# Patient Record
Sex: FEMALE | Race: WHITE | NOT HISPANIC OR LATINO | Employment: OTHER | URBAN - METROPOLITAN AREA
[De-identification: names, ages, dates, MRNs, and addresses within clinical notes are randomized per-mention and may not be internally consistent; named-entity substitution may affect disease eponyms.]

---

## 2018-06-03 ENCOUNTER — OFFICE VISIT (OUTPATIENT)
Dept: URGENT CARE | Facility: CLINIC | Age: 73
End: 2018-06-03
Payer: MEDICARE

## 2018-06-03 VITALS
OXYGEN SATURATION: 97 % | RESPIRATION RATE: 19 BRPM | SYSTOLIC BLOOD PRESSURE: 110 MMHG | DIASTOLIC BLOOD PRESSURE: 77 MMHG | TEMPERATURE: 99 F | HEART RATE: 85 BPM

## 2018-06-03 DIAGNOSIS — N39.0 URINARY TRACT INFECTION WITHOUT HEMATURIA, SITE UNSPECIFIED: Primary | ICD-10-CM

## 2018-06-03 DIAGNOSIS — R53.1 WEAKNESS: ICD-10-CM

## 2018-06-03 LAB
BILIRUB UR QL STRIP: NEGATIVE
GLUCOSE UR QL STRIP: NEGATIVE
KETONES UR QL STRIP: NEGATIVE
LEUKOCYTE ESTERASE UR QL STRIP: POSITIVE
PH, POC UA: 5.5 (ref 5–8)
POC BLOOD, URINE: NEGATIVE
POC NITRATES, URINE: NEGATIVE
PROT UR QL STRIP: NEGATIVE
SP GR UR STRIP: 1.01 (ref 1–1.03)
UROBILINOGEN UR STRIP-ACNC: NORMAL (ref 0.1–1.1)

## 2018-06-03 PROCEDURE — 99203 OFFICE O/P NEW LOW 30 MIN: CPT | Mod: 25,S$GLB,, | Performed by: PHYSICIAN ASSISTANT

## 2018-06-03 PROCEDURE — 81003 URINALYSIS AUTO W/O SCOPE: CPT | Mod: QW,S$GLB,, | Performed by: PHYSICIAN ASSISTANT

## 2018-06-03 RX ORDER — QUINAPRIL 10 MG/1
10 TABLET ORAL NIGHTLY
COMMUNITY

## 2018-06-03 RX ORDER — MULTIVITAMIN
1 TABLET ORAL DAILY
COMMUNITY

## 2018-06-03 RX ORDER — NITROFURANTOIN 25; 75 MG/1; MG/1
100 CAPSULE ORAL 2 TIMES DAILY
Qty: 14 CAPSULE | Refills: 0 | Status: SHIPPED | OUTPATIENT
Start: 2018-06-03 | End: 2018-06-10

## 2018-06-03 NOTE — PROGRESS NOTES
Subjective:       Patient ID: Hanna Prince is a 73 y.o. female.    Vitals:  tympanic temperature is 98.6 °F (37 °C). Her blood pressure is 110/77 and her pulse is 85. Her respiration is 19 and oxygen saturation is 97%.     Chief Complaint: Fatigue    Patient presents with a feeling of light headed that occurred at 630 am this morning. Patient stating she sat down and took deep breath which helped. Patient denies SOB, CP, weakness or numbness in her arms or legs, syncope, or near-syncope.  She denies fevers/chills/malaisea, N/V/D, slurred speech, LOC, or confusion. No hx of diabetes. Patient is leaving to go to [place] tomorrow. She is very active in dancing and other activtites. Denies history of cardiac or resp condisiotns. Patient just had annual physical exam with normal labs about 1 month ago. Patient denies current symptoms.       Fatigue   This is a new problem. The current episode started today. The problem occurs constantly. The problem has been gradually improving. Associated symptoms include fatigue. Pertinent negatives include no abdominal pain, chest pain, chills, fever, headaches, nausea, rash, sore throat or vomiting. Nothing aggravates the symptoms.     Review of Systems   Constitution: Positive for fatigue. Negative for chills, fever and malaise/fatigue.   HENT: Negative for hoarse voice and sore throat.    Eyes: Negative for blurred vision.   Cardiovascular: Negative for chest pain, leg swelling, near-syncope, palpitations and syncope.   Respiratory: Negative for shortness of breath.    Skin: Negative for rash.   Musculoskeletal: Negative for back pain and joint pain.   Gastrointestinal: Negative for abdominal pain, diarrhea, nausea and vomiting.   Neurological: Positive for light-headedness. Negative for dizziness and headaches.   Psychiatric/Behavioral: The patient is not nervous/anxious.        Objective:      Physical Exam   Constitutional: She is oriented to person, place, and time.  Vital signs are normal. She appears well-developed and well-nourished. She is active and cooperative.  Non-toxic appearance. She does not appear ill. No distress.   HENT:   Head: Normocephalic and atraumatic.   Right Ear: Hearing, tympanic membrane, external ear and ear canal normal.   Left Ear: Hearing, tympanic membrane, external ear and ear canal normal.   Nose: Nose normal. No mucosal edema, rhinorrhea or nasal deformity. No epistaxis. Right sinus exhibits no maxillary sinus tenderness and no frontal sinus tenderness. Left sinus exhibits no maxillary sinus tenderness and no frontal sinus tenderness.   Mouth/Throat: Uvula is midline, oropharynx is clear and moist and mucous membranes are normal. No trismus in the jaw. Normal dentition. No uvula swelling. No posterior oropharyngeal erythema.   No temporal TTP   Eyes: Conjunctivae, EOM and lids are normal. Pupils are equal, round, and reactive to light. Right eye exhibits no discharge. Left eye exhibits no discharge. No scleral icterus.   Sclera clear bilat   Neck: Trachea normal, normal range of motion, full passive range of motion without pain and phonation normal. Neck supple. No neck rigidity.   Cardiovascular: Normal rate, regular rhythm, normal heart sounds, intact distal pulses and normal pulses.  PMI is not displaced.    Pulses:       Carotid pulses are 2+ on the right side, and 2+ on the left side.       Radial pulses are 2+ on the right side, and 2+ on the left side.        Popliteal pulses are 2+ on the right side, and 2+ on the left side.        Dorsalis pedis pulses are 2+ on the right side, and 2+ on the left side.   Pulmonary/Chest: Effort normal and breath sounds normal. No respiratory distress.   Abdominal: Soft. Normal appearance and bowel sounds are normal. She exhibits no distension, no abdominal bruit, no pulsatile midline mass and no mass. There is no hepatosplenomegaly. There is no tenderness. There is no rigidity, no rebound, no guarding, no  CVA tenderness, no tenderness at McBurney's point and negative Shaw's sign.   Musculoskeletal: Normal range of motion. She exhibits no edema or deformity.   Neurological: She is alert and oriented to person, place, and time. She has normal strength and normal reflexes. No cranial nerve deficit or sensory deficit. She exhibits normal muscle tone. She displays a negative Romberg sign. Coordination normal.   Reflex Scores:       Tricep reflexes are 2+ on the right side and 2+ on the left side.       Bicep reflexes are 2+ on the right side and 2+ on the left side.       Patellar reflexes are 2+ on the right side and 2+ on the left side.       Achilles reflexes are 2+ on the right side and 2+ on the left side.  Skin: Skin is warm, dry and intact. She is not diaphoretic. No pallor.   Psychiatric: She has a normal mood and affect. Her speech is normal and behavior is normal. Judgment and thought content normal. Cognition and memory are normal.   Nursing note and vitals reviewed.        EKG: NSR with ABN. 86 BPM  Assessment:       1. Urinary tract infection without hematuria, site unspecified    2. Weakness        Plan:         Urinary tract infection without hematuria, site unspecified  -     Urine culture  -     nitrofurantoin, macrocrystal-monohydrate, (MACROBID) 100 MG capsule; Take 1 capsule (100 mg total) by mouth 2 (two) times daily.  Dispense: 14 capsule; Refill: 0    Weakness  -     IN OFFICE EKG 12-LEAD (to Muse)  -     POCT Urinalysis, Dipstick, Automated, W/O Scope    Urinary Tract Infections in Women    Urinary tract infections (UTIs) are most often caused by bacteria (germs). These bacteria enter the urinary tract. The bacteria may come from outside the body. Or they may travel from the skin outside the rectum or vagina into the urethra. Female anatomy makes it easy for bacteria from the bowel to enter a womans urinary tract, which is the most common source of UTI. This means women develop UTIs more often  than men. Pain in or around the urinary tract is a common UTI symptom. But the only way to know for sure if you have a UTI for the healthcare provider to test your urine. The two tests that may be done are the urinalysis and urine culture.  Types of UTIs  · Cystitis: A bladder infection (cystitis) is the most common UTI in women. You may have urgent or frequent urination. You may also have pain, burning when you urinate, and bloody urine.  · Urethritis: This is an inflamed urethra, which is the tube that carries urine from the bladder to outside the body. You may have lower stomach or back pain. You may also have urgent or frequent urination.  · Pyelonephritis: This is a kidney infection. If not treated, it can be serious and damage your kidneys. In severe cases, you may be hospitalized. You may have a fever and lower back pain.  Medicines to treat a UTI  Most UTIs are treated with antibiotics. These kill the bacteria. The length of time you need to take them depends on the type of infection. It may be as short as 3 days. If you have repeated UTIs, a low-dose antibiotic may be needed for several months. Take antibiotics exactly as directed. Dont stop taking them until all of the medicine is gone. If you stop taking the antibiotic too soon, the infection may not go away, and you may develop a resistance to the antibiotic. This can make it much harder to treat.  Lifestyle changes to treat and prevent UTIs  The lifestyle changes below will help get rid of your UTI. They may also help prevent future UTIs.  · Drink plenty of fluids. This includes water, juice, or other caffeine-free drinks. Fluids help flush bacteria out of your body.  · Empty your bladder. Always empty your bladder when you feel the urge to urinate. And always urinate before going to sleep. Urine that stays in your bladder can lead to infection. Try to urinate before and after sex as well.  · Practice good personal hygiene. Wipe yourself from front to  back after using the toilet. This helps keep bacteria from getting into the urethra.  · Use condoms during sex. These help prevent UTIs caused by sexually transmitted bacteria. Also, avoid using spermicides during sex. These can increase the risk of UTIs. Choose other forms of birth control instead. For women who tend to get UTIs after sex, a low-dose of a preventive antibiotic may be used. Be sure to discuss this option with your healthcare provider.  · Follow up with your healthcare provider as directed. He or she may test to make sure the infection has cleared. If needed, more treatment may be started.  Date Last Reviewed: 1/1/2017 © 2000-2017 [employer]. [address] [place] [address], [place] [address]. All rights reserved. This information is not intended as a substitute for professional medical care. Always follow your healthcare professional's instructions.      Weakness (Uncertain Cause)  Based on your exam today, the exact cause of your weakness is not certain. However, your weakness does not seem to be a sign of a serious illness at this time. Keep an eye on your symptoms and get medical advice as instructed below.  Home care  · Rest at home today. Do not over-exert yourself.  · Take any medicine as prescribed.  · For the next few days, drink extra fluids (unless your healthcare provider wants you to restrict fluids for other reasons). Do not skip meals.  Follow-up care  Follow up with your healthcare provider or as advised.  When to seek medical advice  Call your healthcare provider for any of the following  · Worsening of your symptoms  · Symptoms don't start getting better within 2 days  · Fever of 100.4º F (38º C) or higher, or as directed by your healthcare provider·    Call 911  Get emergency medical care for any of these:  · Chest, arm, neck, jaw or upper back pain  · Trouble breathing  · Numbness or weakness of the face, one arm or one leg  · Slurred speech, confusion, trouble speaking,  walking or seeing  · Blood in vomit or stool (black or red color)  · Loss of consciousness  Date Last Reviewed: 6/10/2015  © 1164-2803 [employer], [facility]. [address] [place] [address], [place] [address]. All rights reserved. This information is not intended as a substitute for professional medical care. Always follow your healthcare professional's instructions.      FOLLOW UP WITH YOUR PCP AS DISCUSSED. RECOMMEND FOLLOW UP WITH A CARDIOLOGIST AS WELL DUE TO FAMILY HISTORY. RED FLAGS/WARNINGS SIGNS DISCUSSED WITH PATIENT AND HER SON THAT WOULD WARRANT EMERGENT MEDICAL ATTENTION. THEY VERBALIZED UNDERSTANDING.     Please follow up with your Primary care provider within 2-5 days if your signs and symptoms have not resolved or worsen.     If your condition worsens or fails to improve we recommend that you receive another evaluation at the emergency room immediately or contact your primary medical clinic to discuss your concerns.   You must understand that you have received an Urgent Care treatment only and that you may be released before all of your medical problems are known or treated. You, the patient, will arrange for follow up care as instructed.

## 2018-06-03 NOTE — PATIENT INSTRUCTIONS
Urinary Tract Infections in Women    Urinary tract infections (UTIs) are most often caused by bacteria (germs). These bacteria enter the urinary tract. The bacteria may come from outside the body. Or they may travel from the skin outside the rectum or vagina into the urethra. Female anatomy makes it easy for bacteria from the bowel to enter a womans urinary tract, which is the most common source of UTI. This means women develop UTIs more often than men. Pain in or around the urinary tract is a common UTI symptom. But the only way to know for sure if you have a UTI for the healthcare provider to test your urine. The two tests that may be done are the urinalysis and urine culture.  Types of UTIs  · Cystitis: A bladder infection (cystitis) is the most common UTI in women. You may have urgent or frequent urination. You may also have pain, burning when you urinate, and bloody urine.  · Urethritis: This is an inflamed urethra, which is the tube that carries urine from the bladder to outside the body. You may have lower stomach or back pain. You may also have urgent or frequent urination.  · Pyelonephritis: This is a kidney infection. If not treated, it can be serious and damage your kidneys. In severe cases, you may be hospitalized. You may have a fever and lower back pain.  Medicines to treat a UTI  Most UTIs are treated with antibiotics. These kill the bacteria. The length of time you need to take them depends on the type of infection. It may be as short as 3 days. If you have repeated UTIs, a low-dose antibiotic may be needed for several months. Take antibiotics exactly as directed. Dont stop taking them until all of the medicine is gone. If you stop taking the antibiotic too soon, the infection may not go away, and you may develop a resistance to the antibiotic. This can make it much harder to treat.  Lifestyle changes to treat and prevent UTIs  The lifestyle changes below will help get rid of your UTI. They may  also help prevent future UTIs.  · Drink plenty of fluids. This includes water, juice, or other caffeine-free drinks. Fluids help flush bacteria out of your body.  · Empty your bladder. Always empty your bladder when you feel the urge to urinate. And always urinate before going to sleep. Urine that stays in your bladder can lead to infection. Try to urinate before and after sex as well.  · Practice good personal hygiene. Wipe yourself from front to back after using the toilet. This helps keep bacteria from getting into the urethra.  · Use condoms during sex. These help prevent UTIs caused by sexually transmitted bacteria. Also, avoid using spermicides during sex. These can increase the risk of UTIs. Choose other forms of birth control instead. For women who tend to get UTIs after sex, a low-dose of a preventive antibiotic may be used. Be sure to discuss this option with your healthcare provider.  · Follow up with your healthcare provider as directed. He or she may test to make sure the infection has cleared. If needed, more treatment may be started.  Date Last Reviewed: 1/1/2017  © 5400-7598 [employer]. [address] [place] [address]. All rights reserved. This information is not intended as a substitute for professional medical care. Always follow your healthcare professional's instructions.        Weakness (Uncertain Cause)  Based on your exam today, the exact cause of your weakness is not certain. However, your weakness does not seem to be a sign of a serious illness at this time. Keep an eye on your symptoms and get medical advice as instructed below.  Home care  · Rest at home today. Do not over-exert yourself.  · Take any medicine as prescribed.  · For the next few days, drink extra fluids (unless your healthcare provider wants you to restrict fluids for other reasons). Do not skip meals.  Follow-up care  Follow up with your healthcare provider or as advised.  When to seek medical  advice  Call your healthcare provider for any of the following  · Worsening of your symptoms  · Symptoms don't start getting better within 2 days  · Fever of 100.4º F (38º C) or higher, or as directed by your healthcare provider·    Call 911  Get emergency medical care for any of these:  · Chest, arm, neck, jaw or upper back pain  · Trouble breathing  · Numbness or weakness of the face, one arm or one leg  · Slurred speech, confusion, trouble speaking, walking or seeing  · Blood in vomit or stool (black or red color)  · Loss of consciousness  Date Last Reviewed: 6/10/2015  © 2767-9154 [employer]. [address] [place] [address]. All rights reserved. This information is not intended as a substitute for professional medical care. Always follow your healthcare professional's instructions.      Please follow up with your Primary care provider within 2-5 days if your signs and symptoms have not resolved or worsen.     If your condition worsens or fails to improve we recommend that you receive another evaluation at the emergency room immediately or contact your primary medical clinic to discuss your concerns.   You must understand that you have received an Urgent Care treatment only and that you may be released before all of your medical problems are known or treated. You, the patient, will arrange for follow up care as instructed.

## 2018-06-06 ENCOUNTER — TELEPHONE (OUTPATIENT)
Dept: URGENT CARE | Facility: CLINIC | Age: 73
End: 2018-06-06

## 2018-06-06 LAB
BACTERIA UR CULT: NO GROWTH
BACTERIA UR CULT: NORMAL

## 2018-06-16 ENCOUNTER — TELEPHONE (OUTPATIENT)
Dept: URGENT CARE | Facility: CLINIC | Age: 73
End: 2018-06-16

## 2018-06-16 NOTE — TELEPHONE ENCOUNTER
Patient return phone call. Patient states she was out of town when she was call for her result. Patient was inform that her urine culture was negative. Patient states she no questions at the present time.

## 2025-01-14 ENCOUNTER — OFFICE VISIT (OUTPATIENT)
Dept: UROGYNECOLOGY | Facility: CLINIC | Age: 80
End: 2025-01-14
Payer: MEDICARE

## 2025-01-14 VITALS
HEIGHT: 60 IN | BODY MASS INDEX: 24.63 KG/M2 | DIASTOLIC BLOOD PRESSURE: 78 MMHG | HEART RATE: 84 BPM | SYSTOLIC BLOOD PRESSURE: 128 MMHG | WEIGHT: 125.44 LBS

## 2025-01-14 DIAGNOSIS — N81.11 MIDLINE CYSTOCELE: ICD-10-CM

## 2025-01-14 DIAGNOSIS — K59.09 CHRONIC CONSTIPATION: ICD-10-CM

## 2025-01-14 DIAGNOSIS — N81.6 RECTOCELE: ICD-10-CM

## 2025-01-14 DIAGNOSIS — R33.9 INCOMPLETE BLADDER EMPTYING: ICD-10-CM

## 2025-01-14 DIAGNOSIS — N89.8 VAGINAL DRYNESS: ICD-10-CM

## 2025-01-14 DIAGNOSIS — R35.0 URINARY FREQUENCY: Primary | ICD-10-CM

## 2025-01-14 DIAGNOSIS — N99.3 VAGINAL VAULT PROLAPSE AFTER HYSTERECTOMY: ICD-10-CM

## 2025-01-14 PROCEDURE — 1126F AMNT PAIN NOTED NONE PRSNT: CPT | Mod: CPTII,S$GLB,, | Performed by: NURSE PRACTITIONER

## 2025-01-14 PROCEDURE — 3078F DIAST BP <80 MM HG: CPT | Mod: CPTII,S$GLB,, | Performed by: NURSE PRACTITIONER

## 2025-01-14 PROCEDURE — 87086 URINE CULTURE/COLONY COUNT: CPT | Performed by: NURSE PRACTITIONER

## 2025-01-14 PROCEDURE — 51701 INSERT BLADDER CATHETER: CPT | Mod: S$GLB,,, | Performed by: NURSE PRACTITIONER

## 2025-01-14 PROCEDURE — 1101F PT FALLS ASSESS-DOCD LE1/YR: CPT | Mod: CPTII,S$GLB,, | Performed by: NURSE PRACTITIONER

## 2025-01-14 PROCEDURE — 3288F FALL RISK ASSESSMENT DOCD: CPT | Mod: CPTII,S$GLB,, | Performed by: NURSE PRACTITIONER

## 2025-01-14 PROCEDURE — 1160F RVW MEDS BY RX/DR IN RCRD: CPT | Mod: CPTII,S$GLB,, | Performed by: NURSE PRACTITIONER

## 2025-01-14 PROCEDURE — 1159F MED LIST DOCD IN RCRD: CPT | Mod: CPTII,S$GLB,, | Performed by: NURSE PRACTITIONER

## 2025-01-14 PROCEDURE — 3074F SYST BP LT 130 MM HG: CPT | Mod: CPTII,S$GLB,, | Performed by: NURSE PRACTITIONER

## 2025-01-14 PROCEDURE — 99999 PR PBB SHADOW E&M-NEW PATIENT-LVL III: CPT | Mod: PBBFAC,,, | Performed by: NURSE PRACTITIONER

## 2025-01-14 PROCEDURE — 99203 OFFICE O/P NEW LOW 30 MIN: CPT | Mod: 25,S$GLB,, | Performed by: NURSE PRACTITIONER

## 2025-01-14 RX ORDER — ESTRADIOL 0.1 MG/G
1 CREAM VAGINAL DAILY
Qty: 30 G | Refills: 11 | Status: SHIPPED | OUTPATIENT
Start: 2025-01-14 | End: 2026-01-14

## 2025-01-14 RX ORDER — NITROFURANTOIN 25; 75 MG/1; MG/1
100 CAPSULE ORAL
COMMUNITY
Start: 2025-01-08 | End: 2025-01-15

## 2025-01-14 NOTE — PROGRESS NOTES
[facility] - UROGYNECOLOGY  [address] [address]    Hanna Prince  [id number]  1945  2025    Consulting Physician: Pcp, No   GYN: N/A  Primary M.D.: Sandra Franco MD    Chief Complaint   Patient presents with    prolapse     HPI:     1)  UI:  (--) LANCE.  (+) UUI for several months. Wears Poise underwear daily - wets through one. Daytime frequency: Q 1 hours.  Nocturia: Yes: 2/night.   (--) dysuria,  (--) hematuria,  (--) frequent UTIs.  (+) complete bladder emptying.    2)  POP:  Present. Patient presented to ED on 2025 2/2 complaints of bulging coming from her vagina. Per ED note, + uterine prolapse noted and discharged with referral to urogyn.  below introitus.  Symptoms:(--)   (--) vaginal bleeding. (--) vaginal discharge. (--) sexually active.  (--)  Vaginal dryness.  (--) vaginal estrogen use.     3)  BM:  (--) constipation/straining. Does not have a daily bowel movement, reports going every other day.  (--) chronic diarrhea. (--) hematochezia.  (--) fecal incontinence.  (--) fecal smearing/urgency.  (--) incomplete evacuation.    4) Urinary frequency  -- started a few months ago. No associated dysuria. Has not been to urgent care or PCP. Was started on Macrobid for UTI at ED on 2025 and has 3 doses left. No urine cx available       Past Medical History  Past Medical History:   Diagnosis Date    Hypertension         Past Surgical History  Past Surgical History:   Procedure Laterality Date    HYSTERECTOMY          Hysterectomy: Yes   Date:   Type: open  Cervix present/absent: No  Ovaries present/absent: Yes - thinks she still has ovaries    Past Ob History  P2   x 2  Largest infant weight: 9#  no FAVD. yes episiotomy.      Gynecologic History  LMP: No LMP recorded. Patient has had a hysterectomy.  Age of menarche: 13  Age of menopause: 50s  Menstrual history: regular and normal  Last pap: unknown  Last mammogram: 2022,  normal  Colonoscopy: unknown  DEXA: 11/2022, 1. Interval development of osteopenia in the mean of the measured levels of the L1-L4 vertebra. The mean bone mineral density has decreased since the previous exam.   2. Minimal interval worsening of osteoporosis in the left femoral neck.    3. Osteopenia just below the threshold for osteoporosis in the right femoral neck. The bone mineral density has increased since the previous exam, when it was in the osteoporotic range.     Family History  No family history on file.   Colon CA: No  Breast CA: No  GYN CA: No   CA: No    Social History  Social History     Tobacco Use   Smoking Status Never   Smokeless Tobacco Never     Social History     Substance and Sexual Activity   Alcohol Use Yes    Comment: occ   .    Social History     Substance and Sexual Activity   Drug Use Not on file   .  The patient is single.  Resides in [place] [address].  Employment status: retired at [employer]    Allergies  Review of patient's allergies indicates:  No Known Allergies    Medications  Current Outpatient Medications on File Prior to Visit   Medication Sig Dispense Refill    multivitamin (ONE DAILY MULTIVITAMIN) per tablet Take 1 tablet by mouth once daily.      nitrofurantoin, macrocrystal-monohydrate, (MACROBID) 100 MG capsule Take 100 mg by mouth.      quinapril (ACCUPRIL) 10 MG tablet Take 10 mg by mouth every evening.       No current facility-administered medications on file prior to visit.       Review of Systems A 14 point ROS was reviewed with pertinent positives as noted above in the history of present illness.      Constitutional: negative  Eyes: negative  Endocrine: negative  Gastrointestinal: negative  Cardiovascular: negative  Respiratory: negative  Allergic/Immunologic: negative  Integumentary: negative  Psychiatric: negative  Musculoskeletal: negative   Ear/Nose/Throat: negative  Neurologic: negative  Genitourinary: SEE HPI  Hematologic/Lymphatic: negative    Breast: negative    Urogynecologic Exam  /78 (BP Location: Left arm, Patient Position: Sitting)   Pulse 84   Ht 5' (1.524 m)   Wt 56.9 kg (125 lb 7.1 oz)   BMI 24.50 kg/m²     GENERAL APPEARANCE:  The patient is a well-developed, well-nourished  female.  Neck:  Supple with no thyromegaly, no carotid bruits.  Heart:  Regular rate and rhythm, no murmurs, rubs or gallops.  Lungs:  Clear.  No CVA tenderness.  Abdomen:  Soft, nontender, nondistended, no hepatosplenomegaly.  Incisions:  well-healed pfannenstiel     PELVIC:    External genitalia:  Normal Bartholins, Skenes and labia bilaterally.    Urethra:  No caruncle, diverticulum or masses.  (+) hypermobility.    Vagina:  Atrophy (+) , no bladder masses or tender, no discharge.    Cervix:  absent  Uterus: uterus absent  Adnexa: Not palpable.    POP-Q:  Aa +2; Ba +5; C +2; Ap 0; Bp 0  Genital hiatus 5, perineal body 3 total vaginal length 8.    NEUROLOGIC:  Cranial nerves 2 through 12 intact.  Strength 5/5.  DTRs 2+ lower extremities.  S2 through 4 normal.  Sacral reflexes intact.    EXT: PENA, 2+ pulses bilaterally, no C/C/E    COUGH STRESS TEST:  negative  KEGEL: 1 /5    RECTAL:    External:  Normal, (--) hemorrhoids, (--) dovetailing.   Internal:  Deferred    PVR: 110 mL    Impression    1. Urinary frequency    2. Vaginal vault prolapse after hysterectomy    3. Incomplete bladder emptying    4. Midline cystocele    5. Rectocele    6. Vaginal dryness    7. Chronic constipation        Initial Plan  The patient was counseled regarding these issues. She was given a summary sheet containing each of these issues with possible options for evaluation and management. We also reviewed computer-generated diagrams specific to her pelvic organ prolapse quantification findings and she was given a copy of this for her records.  All her questions were addressed to her satisfaction.    1)  Urge Incontinence/urinary frequency   -- finish course of macrobid  -- Empty bladder  every 2-3 hours even if you don't have the urge.  Empty well: wait a minute, lean forward on toilet.    --Try not to go more frequently than once an hour. When you get the urge to urinate after you have just gone, distract yourself until the urge passes.   --Avoid dietary irritants (see sheet).  Keep diary x 3-5 days to determine your irritants.  --KEGELS: do 10 in AM and 10 in PM, holding each x 10 seconds.  When you feel urge to go, STOP, KEGEL, and when urge has passed, then go to bathroom.    --Consider Pelvic Floor Physical Therapy (PT) in future.    -- PVR (post void residual): 110 mL    2) Chronic Constipation  -- Start daily fiber.  Take 1 tsp of fiber powder (psyllium or other sugar-free powder).  Mix in 8 oz of water.  Take x 3-5 days.  Then, increase fiber by 1 tsp every 3-5 days until stool is easy to pass.  Stop and continue at that dose.   Do not exceed 6 tsps/day.  May also use over the counter stool softener 1-2 x/day.  AVOID laxatives.    3) Vaginal dryness (atrophy)  -- Use 1 gram of estrogen cream in vagina nightly x 2 weeks, then twice a week thereafter.  Remove the applicator and use your finger (up to the second knuckle) to place a dime-sized amount on the inside of your vagina and then around the outer lips and opening.   -- apply Aquaphor or A&D ointment on bulge when wearing Depends/Poise underwear.    4) Vaginal vault prolapse, stage 3  -- pessary fitting scheduled 2/3/25  -- consider urodynamics vs surgical options if fails pessary fitting    5) RTC as scheduled      I spent a total of 30 minutes on the day of the visit.  This includes face to face time and non-face to face time preparing to see the patient (eg, review of tests), obtaining and/or reviewing separately obtained history, documenting clinical information in the electronic or other health record, independently interpreting results and communicating results to the patient/family/caregiver, or care coordinator.     Thank you for  requesting consultation of your patient.  I look forward to participating in her care.

## 2025-01-14 NOTE — PATIENT INSTRUCTIONS
1)  Urge Incontinence/urinary frequency   -- finish course of macrobid  -- Empty bladder every 2-3 hours even if you don't have the urge.  Empty well: wait a minute, lean forward on toilet.    --Try not to go more frequently than once an hour. When you get the urge to urinate after you have just gone, distract yourself until the urge passes.   --Avoid dietary irritants (see sheet).  Keep diary x 3-5 days to determine your irritants.  --KEGELS: do 10 in AM and 10 in PM, holding each x 10 seconds.  When you feel urge to go, STOP, KEGEL, and when urge has passed, then go to bathroom.    --Consider Pelvic Floor Physical Therapy (PT) in future.    -- PVR (post void residual): 110 mL    2) Chronic Constipation  -- Start daily fiber.  Take 1 tsp of fiber powder (psyllium or other sugar-free powder).  Mix in 8 oz of water.  Take x 3-5 days.  Then, increase fiber by 1 tsp every 3-5 days until stool is easy to pass.  Stop and continue at that dose.   Do not exceed 6 tsps/day.  May also use over the counter stool softener 1-2 x/day.  AVOID laxatives.    3) Vaginal dryness (atrophy)  -- Use 1 gram of estrogen cream in vagina nightly x 2 weeks, then twice a week thereafter.  Remove the applicator and use your finger (up to the second knuckle) to place a dime-sized amount on the inside of your vagina and then around the outer lips and opening.   -- apply Aquaphor or A&D ointment on bulge when wearing Depends/Poise underwear.    4) Vaginal vault prolapse, stage 3  -- pessary fitting scheduled 2/3/25  -- consider urodynamics vs surgical options if fails pessary fitting    Bladder Irritants  Certain foods and drinks have been associated with worsening symptoms of urinary frequency, urgency, urge incontinence, or bladder pain. If you suffer from any of these conditions, you may wish to try eliminating one or more of these foods from your diet and see if your symptoms improve. If bladder symptoms are related to dietary factors, strict  adherence to a diet thateliminates the food should bring marked relief in 10 days. Once you are feeling better, you can begin to add foods back into your diet, one at a time. If symptoms return, you will be able to identify the irritant. As you add foods back to your diet it is very important that you drink significant amounts of water.    -----------------------------------------------------------------------------------------------  List of Common Bladder Irritants*  Alcoholic beverages  Apples and apple juice  Cantaloupe  Carbonated beverages  Chili and spicy foods  Chocolate  Citrus fruit  Coffee (including decaffeinated)  Cranberries and cranberry juice  Grapes  Guava  Milk Products: milk, cheese, cottage cheese, yogurt, ice cream  Peaches  Pineapple  Plums  Strawberries  Sugar especially artificial sweeteners, saccharin, aspartame, corn sweeteners, honey, fructose, sucrose, lactose  Tea  Tomatoes and tomato juice  Vitamin B complex  Vinegar  *Most people are not sensitive to ALL of these products; your goal is to find the foods that make YOUR symptoms worse.  ---------------------------------------------------------------------------------------------------    Low-acid fruit substitutions include apricots, papaya, pears and watermelon. Coffee drinkers can drink Kava or other lowacid instant drinks. Tea drinkers can substitute non-citrus herbal and sun brewed teas. Calcium carbonate co-buffered with calcium ascorbate can be substituted for Vitamin C. Prelief is a dietary supplement that works as an acid blocker for the bladder.    Where to get more information:        Overcoming Bladder Disorders by Su Santillan and Elizabeth Daniel, 1990        You Dont Have to Live with Cystitis! By Suki Gaming, 1988  http://[email or web address].org/oab

## 2025-01-16 LAB — BACTERIA UR CULT: NO GROWTH

## 2025-01-17 ENCOUNTER — TELEPHONE (OUTPATIENT)
Dept: UROGYNECOLOGY | Facility: CLINIC | Age: 80
End: 2025-01-17
Payer: MEDICARE

## 2025-01-17 NOTE — TELEPHONE ENCOUNTER
Contacted patient to relay Steven below message. Spoke with son who verbalized understanding and denies other needs at this time. Call ended.      ----- Message from Raquel Coy NP sent at 1/16/2025  5:20 PM CST -----  Please let patient know her urine culture was negative.     Raquel Hodge, FNP-BC

## 2025-02-03 ENCOUNTER — OFFICE VISIT (OUTPATIENT)
Dept: UROGYNECOLOGY | Facility: CLINIC | Age: 80
End: 2025-02-03
Payer: MEDICARE

## 2025-02-03 VITALS
SYSTOLIC BLOOD PRESSURE: 117 MMHG | WEIGHT: 123.69 LBS | HEIGHT: 60 IN | DIASTOLIC BLOOD PRESSURE: 74 MMHG | BODY MASS INDEX: 24.28 KG/M2

## 2025-02-03 DIAGNOSIS — R33.9 INCOMPLETE BLADDER EMPTYING: ICD-10-CM

## 2025-02-03 DIAGNOSIS — N81.11 MIDLINE CYSTOCELE: ICD-10-CM

## 2025-02-03 DIAGNOSIS — N81.6 RECTOCELE: ICD-10-CM

## 2025-02-03 DIAGNOSIS — N89.8 VAGINAL DRYNESS: ICD-10-CM

## 2025-02-03 DIAGNOSIS — R35.0 URINARY FREQUENCY: Primary | ICD-10-CM

## 2025-02-03 DIAGNOSIS — N99.3 VAGINAL VAULT PROLAPSE AFTER HYSTERECTOMY: ICD-10-CM

## 2025-02-03 PROCEDURE — 3078F DIAST BP <80 MM HG: CPT | Mod: CPTII,S$GLB,, | Performed by: NURSE PRACTITIONER

## 2025-02-03 PROCEDURE — 99213 OFFICE O/P EST LOW 20 MIN: CPT | Mod: 25,S$GLB,, | Performed by: NURSE PRACTITIONER

## 2025-02-03 PROCEDURE — 1160F RVW MEDS BY RX/DR IN RCRD: CPT | Mod: CPTII,S$GLB,, | Performed by: NURSE PRACTITIONER

## 2025-02-03 PROCEDURE — 1126F AMNT PAIN NOTED NONE PRSNT: CPT | Mod: CPTII,S$GLB,, | Performed by: NURSE PRACTITIONER

## 2025-02-03 PROCEDURE — 57160 INSERT PESSARY/OTHER DEVICE: CPT | Mod: S$GLB,,, | Performed by: NURSE PRACTITIONER

## 2025-02-03 PROCEDURE — 1159F MED LIST DOCD IN RCRD: CPT | Mod: CPTII,S$GLB,, | Performed by: NURSE PRACTITIONER

## 2025-02-03 PROCEDURE — 99999 PR PBB SHADOW E&M-EST. PATIENT-LVL III: CPT | Mod: PBBFAC,,, | Performed by: NURSE PRACTITIONER

## 2025-02-03 PROCEDURE — 3074F SYST BP LT 130 MM HG: CPT | Mod: CPTII,S$GLB,, | Performed by: NURSE PRACTITIONER

## 2025-02-03 NOTE — PROGRESS NOTES
Urogyn follow up  02/03/2025  .  [facility] - UROGYNECOLOGY  [address] [address]    Hanna Prince  [id number]  1945      Hanna Prince is a 79 y.o. here for a urogyn follow up for pessary fitting.    Last HPI from 01/14/2025  1)  UI:  (--) LANCE.  (+) UUI for several months. Wears Poise underwear daily - wets through one. Daytime frequency: Q 1 hours.  Nocturia: Yes: 2/night.   (--) dysuria,  (--) hematuria,  (--) frequent UTIs.  (+) complete bladder emptying.     2)  POP:  Present. Patient presented to ED on 1/8/2025 2/2 complaints of bulging coming from her vagina. Per ED note, + uterine prolapse noted and discharged with referral to urogyn.  below introitus.  Symptoms:(--)   (--) vaginal bleeding. (--) vaginal discharge. (--) sexually active.  (--)  Vaginal dryness.  (--) vaginal estrogen use.   POP-Q:  Aa +2; Ba +5; C +2; Ap 0; Bp 0  Genital hiatus 5, perineal body 3 total vaginal length 8.   Pvr 110 mL     3)  BM:  (--) constipation/straining. Does not have a daily bowel movement, reports going every other day.  (--) chronic diarrhea. (--) hematochezia.  (--) fecal incontinence.  (--) fecal smearing/urgency.  (--) incomplete evacuation.     4) Urinary frequency  -- started a few months ago. No associated dysuria. Has not been to urgent care or PCP. Was started on Macrobid for UTI at ED on 1/8/2025 and has 3 doses left. No urine cx available            Past Medical History:   Diagnosis Date    Hypertension        Past Surgical History:   Procedure Laterality Date    HYSTERECTOMY         No family history on file.    Social History     Socioeconomic History    Marital status: Single   Tobacco Use    Smoking status: Never    Smokeless tobacco: Never   Substance and Sexual Activity    Alcohol use: Yes     Comment: occ       Current Outpatient Medications   Medication Sig Dispense Refill    estradioL (ESTRACE) 0.01 % (0.1 mg/gram) vaginal cream Place 1 g vaginally once daily. 30  g 11    multivitamin (ONE DAILY MULTIVITAMIN) per tablet Take 1 tablet by mouth once daily.      quinapril (ACCUPRIL) 10 MG tablet Take 10 mg by mouth every evening.       No current facility-administered medications for this visit.       Review of patient's allergies indicates:  No Known Allergies    Well woman:  Last pap: unknown  Last mammogram: 11/2022, normal  Colonoscopy: unknown  DEXA: 11/2022, 1. Interval development of osteopenia in the mean of the measured levels of the L1-L4 vertebra. The mean bone mineral density has decreased since the previous exam.   2. Minimal interval worsening of osteoporosis in the left femoral neck.    3. Osteopenia just below the threshold for osteoporosis in the right femoral neck. The bone mineral density has increased since the previous exam, when it was in the osteoporotic range.     ROS:  As per HPI.      Exam  /74 (Patient Position: Sitting)   Ht 5' (1.524 m)   Wt 56.1 kg (123 lb 10.9 oz)   BMI 24.15 kg/m²   General: alert and oriented, no acute distress  Respiratory: normal respiratory effort  Abd: soft, non-tender, non-distended    Pelvic  Ext. Genitalia: normal external genitalia. Normal bartholin's and skeens glands  Vagina: + atrophy. Normal vaginal mucosa without lesions. No discharge noted.   Non-tender bladder base without palpable mass.  Cervix: absent  Uterus:  absent   Urethra: no masses or tenderness  Urethral meatus: no lesions, caruncle or prolapse.    The patient was fit with #3 ring with support pessary.  She was able to tolerate the device comfortabley with bending, squatting, valsalva.  She was not able to demonstrate independent removal and placement.  Will order pessary from [employer]. She tolerated the procedure well.       Impression  1. 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 Incomplete bladder emptying        4. Midline cystocele        5. Rectocele        6. Vaginal dryness          We reviewed the  above issues and discussed options for short-term versus long-term management of her problems.   Plan:   Cystocele/ vaginal vault prolapse  --fit with #3 ring with support pessary -- will order from [facility]    2) Vaginal dryness (atrophy)  -- Use 1 gram of estrogen cream in vagina  twice a week thereafter.  Remove the applicator and use your finger (up to the second knuckle) to place a dime-sized amount on the inside of your vagina and then around the outer lips and opening.   -- apply Aquaphor or A&D ointment on bulge when wearing Depends/Poise underwear.     3. RTC for pessary insertion     I spent a total of 20 minutes on the day of the visit.  This includes face to face time and non-face to face time preparing to see the patient (eg, review of tests), obtaining and/or reviewing separately obtained history, documenting clinical information in the electronic or other health record, independently interpreting results and communicating results to the patient/family/caregiver, or care coordinator.     Raquel Coy, FNP-BC  [facility]  Division of Female Pelvic Medicine and Reconstructive Surgery  Department of Obstetrics & Gynecology

## 2025-02-03 NOTE — PATIENT INSTRUCTIONS
Cystocele/ vaginal vault prolapse  --fit with #3 ring with support pessary -- will order from [employer]    2) Vaginal dryness (atrophy)  -- Use 1 gram of estrogen cream in vagina  twice a week thereafter.  Remove the applicator and use your finger (up to the second knuckle) to place a dime-sized amount on the inside of your vagina and then around the outer lips and opening.   -- apply Aquaphor or A&D ointment on bulge when wearing Depends/Poise underwear.     3. RTC for pessary insertion

## 2025-02-20 ENCOUNTER — TELEPHONE (OUTPATIENT)
Dept: UROGYNECOLOGY | Facility: CLINIC | Age: 80
End: 2025-02-20
Payer: MEDICARE

## 2025-02-20 NOTE — TELEPHONE ENCOUNTER
----- Message from Parvin sent at 2/19/2025  4:56 PM CST -----  Regarding: reschedule  Name of Who is Calling: GABY aPulson What is the request in detail: Lorene is requesting a call back to reschedule patient appointment.  Can the clinic reply by [facility]: No What Number [facility]: [phone]

## 2025-02-21 ENCOUNTER — OFFICE VISIT (OUTPATIENT)
Dept: UROGYNECOLOGY | Facility: CLINIC | Age: 80
End: 2025-02-21
Payer: MEDICARE

## 2025-02-21 VITALS
SYSTOLIC BLOOD PRESSURE: 136 MMHG | WEIGHT: 125 LBS | HEART RATE: 85 BPM | DIASTOLIC BLOOD PRESSURE: 86 MMHG | BODY MASS INDEX: 24.54 KG/M2 | HEIGHT: 60 IN

## 2025-02-21 DIAGNOSIS — K59.09 CHRONIC CONSTIPATION: ICD-10-CM

## 2025-02-21 DIAGNOSIS — N99.3 VAGINAL VAULT PROLAPSE AFTER HYSTERECTOMY: ICD-10-CM

## 2025-02-21 DIAGNOSIS — N89.8 VAGINAL DRYNESS: ICD-10-CM

## 2025-02-21 DIAGNOSIS — N81.11 MIDLINE CYSTOCELE: ICD-10-CM

## 2025-02-21 DIAGNOSIS — R35.0 URINARY FREQUENCY: Primary | ICD-10-CM

## 2025-02-21 DIAGNOSIS — R33.9 INCOMPLETE BLADDER EMPTYING: ICD-10-CM

## 2025-02-21 DIAGNOSIS — Z46.89 PESSARY MAINTENANCE: ICD-10-CM

## 2025-02-21 DIAGNOSIS — N81.6 RECTOCELE: ICD-10-CM

## 2025-02-21 PROCEDURE — 3288F FALL RISK ASSESSMENT DOCD: CPT | Mod: CPTII,S$GLB,, | Performed by: NURSE PRACTITIONER

## 2025-02-21 PROCEDURE — 3079F DIAST BP 80-89 MM HG: CPT | Mod: CPTII,S$GLB,, | Performed by: NURSE PRACTITIONER

## 2025-02-21 PROCEDURE — 1160F RVW MEDS BY RX/DR IN RCRD: CPT | Mod: CPTII,S$GLB,, | Performed by: NURSE PRACTITIONER

## 2025-02-21 PROCEDURE — 3075F SYST BP GE 130 - 139MM HG: CPT | Mod: CPTII,S$GLB,, | Performed by: NURSE PRACTITIONER

## 2025-02-21 PROCEDURE — 1159F MED LIST DOCD IN RCRD: CPT | Mod: CPTII,S$GLB,, | Performed by: NURSE PRACTITIONER

## 2025-02-21 PROCEDURE — 1101F PT FALLS ASSESS-DOCD LE1/YR: CPT | Mod: CPTII,S$GLB,, | Performed by: NURSE PRACTITIONER

## 2025-02-21 PROCEDURE — 99212 OFFICE O/P EST SF 10 MIN: CPT | Mod: S$GLB,,, | Performed by: NURSE PRACTITIONER

## 2025-02-21 PROCEDURE — 99999 PR PBB SHADOW E&M-EST. PATIENT-LVL III: CPT | Mod: PBBFAC,,, | Performed by: NURSE PRACTITIONER

## 2025-02-21 PROCEDURE — 1126F AMNT PAIN NOTED NONE PRSNT: CPT | Mod: CPTII,S$GLB,, | Performed by: NURSE PRACTITIONER

## 2025-02-21 NOTE — PROGRESS NOTES
Urogyn follow up  02/21/2025  .  [facility] [place] - UROGYNECOLOGY  [address] [address]    Hanna Prince  [id number]  1945      Hanna Prince is a 79 y.o. here for a urogyn follow up for pessary insertion    Last HPI from 01/14/2025  1)  UI:  (--) LANCE.  (+) UUI for several months. Wears Poise underwear daily - wets through one. Daytime frequency: Q 1 hours.  Nocturia: Yes: 2/night.   (--) dysuria,  (--) hematuria,  (--) frequent UTIs.  (+) complete bladder emptying.     2)  POP:  Present. Patient presented to ED on 1/8/2025 2/2 complaints of bulging coming from her vagina. Per ED note, + uterine prolapse noted and discharged with referral to urogyn.  below introitus.  Symptoms:(--)   (--) vaginal bleeding. (--) vaginal discharge. (--) sexually active.  (--)  Vaginal dryness.  (--) vaginal estrogen use.   POP-Q:  Aa +2; Ba +5; C +2; Ap 0; Bp 0  Genital hiatus 5, perineal body 3 total vaginal length 8.   Pvr 110 mL     3)  BM:  (--) constipation/straining. Does not have a daily bowel movement, reports going every other day.  (--) chronic diarrhea. (--) hematochezia.  (--) fecal incontinence.  (--) fecal smearing/urgency.  (--) incomplete evacuation.     4) Urinary frequency  -- started a few months ago. No associated dysuria. Has not been to urgent care or PCP. Was started on Macrobid for UTI at ED on 1/8/2025 and has 3 doses left. No urine cx available      02/03/2025  Fit with #3 ring with support pessary      Past Medical History:   Diagnosis Date    Hypertension        Past Surgical History:   Procedure Laterality Date    HYSTERECTOMY         No family history on file.    Social History     Socioeconomic History    Marital status: Single   Tobacco Use    Smoking status: Never    Smokeless tobacco: Never   Substance and Sexual Activity    Alcohol use: Yes     Comment: occ       Current Outpatient Medications   Medication Sig Dispense Refill    estradioL (ESTRACE) 0.01 % (0.1 mg/gram)  vaginal cream Place 1 g vaginally once daily. 30 g 11    multivitamin (ONE DAILY MULTIVITAMIN) per tablet Take 1 tablet by mouth once daily.      quinapril (ACCUPRIL) 10 MG tablet Take 10 mg by mouth every evening.       No current facility-administered medications for this visit.       Review of patient's allergies indicates:  No Known Allergies    Well woman:  Last pap: unknown  Last mammogram: 11/2022, normal  Colonoscopy: unknown  DEXA: 11/2022, 1. Interval development of osteopenia in the mean of the measured levels of the L1-L4 vertebra. The mean bone mineral density has decreased since the previous exam.   2. Minimal interval worsening of osteoporosis in the left femoral neck.    3. Osteopenia just below the threshold for osteoporosis in the right femoral neck. The bone mineral density has increased since the previous exam, when it was in the osteoporotic range.     ROS:  As per HPI.      Exam  /86 (BP Location: Right arm, Patient Position: Sitting)   Pulse 85   Ht 5' (1.524 m)   Wt 56.7 kg (125 lb)   BMI 24.41 kg/m²   General: alert and oriented, no acute distress  Respiratory: normal respiratory effort  Abd: soft, non-tender, non-distended    Pelvic  Ext. Genitalia: normal external genitalia. Normal bartholin's and skeens glands  Vagina: + atrophy. Normal vaginal mucosa without lesions. No discharge noted.   Non-tender bladder base without palpable mass.  Cervix: absent  Uterus:  absent   Urethra: no masses or tenderness  Urethral meatus: no lesions, caruncle or prolapse.    #3 ring with support pessary inserted    Impression  1. 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 Midline cystocele        4. Rectocele        5. Incomplete bladder emptying        6. Vaginal dryness        7. Chronic constipation        8. Pessary maintenance            We reviewed the above issues and discussed options for short-term versus long-term management of her problems.   Plan:    Cystocele/ vaginal vault prolapse  --trial #3 ring with support pessary -- will order from [employer]    2) Vaginal dryness (atrophy)  -- Use 1 gram of estrogen cream in vagina  twice a week thereafter.  Remove the applicator and use your finger (up to the second knuckle) to place a dime-sized amount on the inside of your vagina and then around the outer lips and opening.   -- apply Aquaphor or A&D ointment on bulge when wearing Depends/Poise underwear.     3. RTC for pessary check in 4 weeks     I spent a total of 10 minutes on the day of the visit.  This includes face to face time and non-face to face time preparing to see the patient (eg, review of tests), obtaining and/or reviewing separately obtained history, documenting clinical information in the electronic or other health record, independently interpreting results and communicating results to the patient/family/caregiver, or care coordinator.     Raquel Coy, FNP-BC  [facility]  Division of Female Pelvic Medicine and Reconstructive Surgery  Department of Obstetrics & Gynecology

## 2025-02-26 ENCOUNTER — OFFICE VISIT (OUTPATIENT)
Dept: UROGYNECOLOGY | Facility: CLINIC | Age: 80
End: 2025-02-26
Payer: MEDICARE

## 2025-02-26 VITALS
DIASTOLIC BLOOD PRESSURE: 75 MMHG | HEIGHT: 60 IN | WEIGHT: 121.5 LBS | HEART RATE: 83 BPM | BODY MASS INDEX: 23.85 KG/M2 | SYSTOLIC BLOOD PRESSURE: 130 MMHG

## 2025-02-26 DIAGNOSIS — R33.9 INCOMPLETE BLADDER EMPTYING: ICD-10-CM

## 2025-02-26 DIAGNOSIS — R35.0 URINARY FREQUENCY: Primary | ICD-10-CM

## 2025-02-26 DIAGNOSIS — Z46.89 PESSARY MAINTENANCE: ICD-10-CM

## 2025-02-26 DIAGNOSIS — N81.6 RECTOCELE: ICD-10-CM

## 2025-02-26 DIAGNOSIS — N99.3 VAGINAL VAULT PROLAPSE AFTER HYSTERECTOMY: ICD-10-CM

## 2025-02-26 DIAGNOSIS — N89.8 VAGINAL DRYNESS: ICD-10-CM

## 2025-02-26 DIAGNOSIS — N81.11 MIDLINE CYSTOCELE: ICD-10-CM

## 2025-02-26 PROCEDURE — 99999 PR PBB SHADOW E&M-EST. PATIENT-LVL III: CPT | Mod: PBBFAC,,, | Performed by: NURSE PRACTITIONER

## 2025-02-26 PROCEDURE — 1126F AMNT PAIN NOTED NONE PRSNT: CPT | Mod: CPTII,S$GLB,, | Performed by: NURSE PRACTITIONER

## 2025-02-26 PROCEDURE — 3078F DIAST BP <80 MM HG: CPT | Mod: CPTII,S$GLB,, | Performed by: NURSE PRACTITIONER

## 2025-02-26 PROCEDURE — 99213 OFFICE O/P EST LOW 20 MIN: CPT | Mod: S$GLB,,, | Performed by: NURSE PRACTITIONER

## 2025-02-26 PROCEDURE — 1159F MED LIST DOCD IN RCRD: CPT | Mod: CPTII,S$GLB,, | Performed by: NURSE PRACTITIONER

## 2025-02-26 PROCEDURE — 3075F SYST BP GE 130 - 139MM HG: CPT | Mod: CPTII,S$GLB,, | Performed by: NURSE PRACTITIONER

## 2025-02-26 PROCEDURE — 1160F RVW MEDS BY RX/DR IN RCRD: CPT | Mod: CPTII,S$GLB,, | Performed by: NURSE PRACTITIONER

## 2025-02-26 NOTE — PROGRESS NOTES
Urogyn follow up  02/26/2025  .  [facility]  [address]  [facility] [id number]    Hanna Prince  [id number]  1945      Hanna Prince is a 79 y.o. here for a urogyn follow up for pessary insertion    Last HPI from 01/14/2025  1)  UI:  (--) LANCE.  (+) UUI for several months. Wears Poise underwear daily - wets through one. Daytime frequency: Q 1 hours.  Nocturia: Yes: 2/night.   (--) dysuria,  (--) hematuria,  (--) frequent UTIs.  (+) complete bladder emptying.     2)  POP:  Present. Patient presented to ED on 1/8/2025 2/2 complaints of bulging coming from her vagina. Per ED note, + uterine prolapse noted and discharged with referral to urogyn.  below introitus.  Symptoms:(--)   (--) vaginal bleeding. (--) vaginal discharge. (--) sexually active.  (--)  Vaginal dryness.  (--) vaginal estrogen use.   POP-Q:  Aa +2; Ba +5; C +2; Ap 0; Bp 0  Genital hiatus 5, perineal body 3 total vaginal length 8.   Pvr 110 mL     3)  BM:  (--) constipation/straining. Does not have a daily bowel movement, reports going every other day.  (--) chronic diarrhea. (--) hematochezia.  (--) fecal incontinence.  (--) fecal smearing/urgency.  (--) incomplete evacuation.     4) Urinary frequency  -- started a few months ago. No associated dysuria. Has not been to urgent care or PCP. Was started on Macrobid for UTI at ED on 1/8/2025 and has 3 doses left. No urine cx available      02/03/2025  Fit with #3 ring with support pessary    02/21/2025  #3 ring with support pessary inserted    Changes since last visit  Pessary came out same day as insertion      Past Medical History:   Diagnosis Date    Hypertension        Past Surgical History:   Procedure Laterality Date    HYSTERECTOMY         No family history on file.    Social History     Socioeconomic History    Marital status: Single   Tobacco Use    Smoking status: Never    Smokeless tobacco: Never   Substance and Sexual Activity    Alcohol use: Yes     Comment:  occ       Current Outpatient Medications   Medication Sig Dispense Refill    estradioL (ESTRACE) 0.01 % (0.1 mg/gram) vaginal cream Place 1 g vaginally once daily. 30 g 11    multivitamin (ONE DAILY MULTIVITAMIN) per tablet Take 1 tablet by mouth once daily.      quinapril (ACCUPRIL) 10 MG tablet Take 10 mg by mouth every evening.       No current facility-administered medications for this visit.       Review of patient's allergies indicates:  No Known Allergies    Well woman:  Last pap: unknown  Last mammogram: 11/2022, normal  Colonoscopy: unknown  DEXA: 11/2022, 1. Interval development of osteopenia in the mean of the measured levels of the L1-L4 vertebra. The mean bone mineral density has decreased since the previous exam.   2. Minimal interval worsening of osteoporosis in the left femoral neck.    3. Osteopenia just below the threshold for osteoporosis in the right femoral neck. The bone mineral density has increased since the previous exam, when it was in the osteoporotic range.     ROS:  As per HPI.      Exam  /75 (BP Location: Right arm, Patient Position: Sitting)   Pulse 83   Ht 5' (1.524 m)   Wt 55.1 kg (121 lb 7.6 oz)   BMI 23.72 kg/m²   General: alert and oriented, no acute distress  Respiratory: normal respiratory effort  Abd: soft, non-tender, non-distended    Pelvic  Ext. Genitalia: normal external genitalia. Normal bartholin's and skeens glands  Vagina: + atrophy. Normal vaginal mucosa without lesions. No discharge noted.   Non-tender bladder base without palpable mass.  Cervix: absent  Uterus:  absent   Urethra: no masses or tenderness  Urethral meatus: no lesions, caruncle or prolapse.    #3 ring with support pessary inserted    Attempted to teach patient reinsertion-- not able to perform independently    Impression  1. 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 Midline cystocele        4. Rectocele        5. Incomplete bladder emptying        6.  Pessary maintenance        7. Vaginal dryness              We reviewed the above issues and discussed options for short-term versus long-term management of her problems.   Plan:   Cystocele/ vaginal vault prolapse  --trial #3 ring with support pessary -- will order from [facility]    2) Vaginal dryness (atrophy)  -- Use 1 gram of estrogen cream in vagina  twice a week thereafter.  Remove the applicator and use your finger (up to the second knuckle) to place a dime-sized amount on the inside of your vagina and then around the outer lips and opening.   -- apply Aquaphor or A&D ointment on bulge when wearing Depends/Poise underwear.     3. RTC for pessary check in 4 weeks     I spent a total of 20 minutes on the day of the visit.  This includes face to face time and non-face to face time preparing to see the patient (eg, review of tests), obtaining and/or reviewing separately obtained history, documenting clinical information in the electronic or other health record, independently interpreting results and communicating results to the patient/family/caregiver, or care coordinator.     Raquel Coy, MADINA-BC [facility]  Division of Female Pelvic Medicine and Reconstructive Surgery  Department of Obstetrics & Gynecology

## 2025-04-07 ENCOUNTER — OFFICE VISIT (OUTPATIENT)
Dept: UROGYNECOLOGY | Facility: CLINIC | Age: 80
End: 2025-04-07
Payer: MEDICARE

## 2025-04-07 VITALS — SYSTOLIC BLOOD PRESSURE: 147 MMHG | DIASTOLIC BLOOD PRESSURE: 88 MMHG | HEART RATE: 77 BPM

## 2025-04-07 DIAGNOSIS — R35.0 URINARY FREQUENCY: ICD-10-CM

## 2025-04-07 DIAGNOSIS — N99.3 VAGINAL VAULT PROLAPSE AFTER HYSTERECTOMY: ICD-10-CM

## 2025-04-07 DIAGNOSIS — N36.2 URETHRAL POLYP: ICD-10-CM

## 2025-04-07 DIAGNOSIS — N39.46 MIXED INCONTINENCE URGE AND STRESS: ICD-10-CM

## 2025-04-07 DIAGNOSIS — N81.6 RECTOCELE: ICD-10-CM

## 2025-04-07 DIAGNOSIS — K59.09 CHRONIC CONSTIPATION: ICD-10-CM

## 2025-04-07 DIAGNOSIS — N81.11 MIDLINE CYSTOCELE: ICD-10-CM

## 2025-04-07 DIAGNOSIS — N89.8 VAGINAL DRYNESS: ICD-10-CM

## 2025-04-07 DIAGNOSIS — R33.9 INCOMPLETE BLADDER EMPTYING: ICD-10-CM

## 2025-04-07 PROCEDURE — 99214 OFFICE O/P EST MOD 30 MIN: CPT | Mod: 25,S$GLB,, | Performed by: NURSE PRACTITIONER

## 2025-04-07 PROCEDURE — 1159F MED LIST DOCD IN RCRD: CPT | Mod: CPTII,S$GLB,, | Performed by: NURSE PRACTITIONER

## 2025-04-07 PROCEDURE — 1126F AMNT PAIN NOTED NONE PRSNT: CPT | Mod: CPTII,S$GLB,, | Performed by: NURSE PRACTITIONER

## 2025-04-07 PROCEDURE — 3079F DIAST BP 80-89 MM HG: CPT | Mod: CPTII,S$GLB,, | Performed by: NURSE PRACTITIONER

## 2025-04-07 PROCEDURE — 51701 INSERT BLADDER CATHETER: CPT | Mod: S$GLB,,, | Performed by: NURSE PRACTITIONER

## 2025-04-07 PROCEDURE — 99999 PR PBB SHADOW E&M-EST. PATIENT-LVL III: CPT | Mod: PBBFAC,,, | Performed by: NURSE PRACTITIONER

## 2025-04-07 PROCEDURE — 3077F SYST BP >= 140 MM HG: CPT | Mod: CPTII,S$GLB,, | Performed by: NURSE PRACTITIONER

## 2025-04-07 PROCEDURE — 1160F RVW MEDS BY RX/DR IN RCRD: CPT | Mod: CPTII,S$GLB,, | Performed by: NURSE PRACTITIONER

## 2025-04-07 PROCEDURE — 87086 URINE CULTURE/COLONY COUNT: CPT | Performed by: NURSE PRACTITIONER

## 2025-04-07 NOTE — Clinical Note
Please remember to talk to me about this case. She has either a large caruncle or urethral polyp. Not sure she will ever get the estrogen cream where is needs to be due to the prolpase.  K

## 2025-04-07 NOTE — PATIENT INSTRUCTIONS
Cystocele/ vaginal vault prolapse  --failed pessary  --D-2    2) Vaginal dryness (atrophy)  -- Use 1 gram of estrogen cream in vagina  twice a week thereafter.  Remove the applicator and use your finger (up to the second knuckle) to place a dime-sized amount on the inside of your vagina and then around the outer lips and opening.   -- apply Aquaphor or A&D ointment on bulge when wearing Depends/Poise underwear.     3. RTC for suds

## 2025-04-07 NOTE — PROGRESS NOTES
Urogyn follow up  04/07/2025  .  [facility] - UROGYNECOLOGY  [address] [address]    Hanna Prince  [id number]  1945      Hanna Prince is a 80 y.o. here for a urogyn follow up for pessary check    Last HPI from 01/14/2025  1)  UI:  (--) LANCE.  (+) UUI for several months. Wears Poise underwear daily - wets through one. Daytime frequency: Q 1 hours.  Nocturia: Yes: 2/night.   (--) dysuria,  (--) hematuria,  (--) frequent UTIs.  (+) complete bladder emptying.     2)  POP:  Present. Patient presented to ED on 1/8/2025 2/2 complaints of bulging coming from her vagina. Per ED note, + uterine prolapse noted and discharged with referral to urogyn.  below introitus.  Symptoms:(--)   (--) vaginal bleeding. (--) vaginal discharge. (--) sexually active.  (--)  Vaginal dryness.  (--) vaginal estrogen use.   POP-Q:  Aa +2; Ba +5; C +2; Ap 0; Bp 0  Genital hiatus 5, perineal body 3 total vaginal length 8.   Pvr 110 mL     3)  BM:  (--) constipation/straining. Does not have a daily bowel movement, reports going every other day.  (--) chronic diarrhea. (--) hematochezia.  (--) fecal incontinence.  (--) fecal smearing/urgency.  (--) incomplete evacuation.     4) Urinary frequency  -- started a few months ago. No associated dysuria. Has not been to urgent care or PCP. Was started on Macrobid for UTI at ED on 1/8/2025 and has 3 doses left. No urine cx available      02/03/2025  Fit with #3 ring with support pessary    02/21/2025  #3 ring with support pessary inserted    02/26/2025  Pessary came out same day as insertion    Changes since last visit:  Doing well. Still has urinary frequency  Pessary is out --unsure when she lost it  Not using estrogen cream       Past Medical History:   Diagnosis Date    Hypertension        Past Surgical History:   Procedure Laterality Date    HYSTERECTOMY         No family history on file.    Social History     Socioeconomic History    Marital status: Single    Tobacco Use    Smoking status: Never    Smokeless tobacco: Never   Substance and Sexual Activity    Alcohol use: Yes     Comment: occ       Current Outpatient Medications   Medication Sig Dispense Refill    estradioL (ESTRACE) 0.01 % (0.1 mg/gram) vaginal cream Place 1 g vaginally once daily. 30 g 11    multivitamin (ONE DAILY MULTIVITAMIN) per tablet Take 1 tablet by mouth once daily.      quinapril (ACCUPRIL) 10 MG tablet Take 10 mg by mouth every evening.       No current facility-administered medications for this visit.       Review of patient's allergies indicates:  No Known Allergies    Well woman:  Last pap: unknown  Last mammogram: 11/2022, normal  Colonoscopy: unknown  DEXA: 11/2022, 1. Interval development of osteopenia in the mean of the measured levels of the L1-L4 vertebra. The mean bone mineral density has decreased since the previous exam.   2. Minimal interval worsening of osteoporosis in the left femoral neck.    3. Osteopenia just below the threshold for osteoporosis in the right femoral neck. The bone mineral density has increased since the previous exam, when it was in the osteoporotic range.     ROS:  As per HPI.      Exam  BP (!) 147/88 (BP Location: Left arm, Patient Position: Sitting)   Pulse 77   General: alert and oriented, no acute distress  Respiratory: normal respiratory effort  Abd: soft, non-tender, non-distended    Pelvic  Ext. Genitalia: normal external genitalia. Normal bartholin's and skeens glands  Vagina: + atrophy. Normal vaginal mucosa without lesions. No discharge noted.   Non-tender bladder base without palpable mass.  Cervix: absent  Uterus:  absent   Urethra: no masses or tenderness  Urethral meatus: no lesions, + urethral caruncle vs. Polyp              Impression  1. 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 Midline cystocele        4. Rectocele        5.  Vaginal dryness        6. Incomplete bladder emptying  US Retroperitoneal Complete      7. Chronic constipation        8. Urinary frequency        9. Urethral polyp                We reviewed the above issues and discussed options for short-term versus long-term management of her problems.   Plan:   Cystocele/ vaginal vault prolapse  --failed pessary      2) Vaginal dryness (atrophy)  -- Use 1 gram of estrogen cream in vagina  twice a week thereafter.  Remove the applicator and use your finger (up to the second knuckle) to place a dime-sized amount on the inside of your vagina and then around the outer lips and opening.   -- apply Aquaphor or A&D ointment on bulge when wearing Depends/Poise underwear.     3. Incomplete bladder emptying  --retroperitoneal ultrasound    4. RTC for suds     I spent a total of 30 minutes on the day of the visit.  This includes face to face time and non-face to face time preparing to see the patient (eg, review of tests), obtaining and/or reviewing separately obtained history, documenting clinical information in the electronic or other health record, independently interpreting results and communicating results to the patient/family/caregiver, or care coordinator.     Raquel Coy, HENRIQUEP-BC [facility]  Division of Female Pelvic Medicine and Reconstructive Surgery  Department of Obstetrics & Gynecology

## 2025-04-07 NOTE — Clinical Note
Please help her schedule the retroperitoneal ultrasound  Thanks, Raquel SW/CM Infant Initial Plan of Care Note     Baseline Assessment  Received referral for  SW consult due to NICU admission.  Reviewed medical record. Patient was admitted to the hospital on 2021 at Gestational Age: 32w4d with a diagnosis of prematurity.      Met with mother in [place].  Explained SW/CM role, reason for involvement, support available from hospital staff and SW/CM.  Answered questions about the hospitalization.      Patient/Family Information  Current Living Arrangements    Infant’s Primary Caregiver    Services Prior to Hospitalization    Support Systems    Parents denied need for assistance with transportation resources to visit the infant.      Insurance  Primary: [facility]  Secondary: N/A      Progress Note  SW received MD order for SW consult due to NICU admission. Reviewed medical record.   Patient was admitted to the NICU at 33 weeks gestation for prematurity.  SW met with mother in NICU at bedside. SW explained SW role and reason for involvement. Mother was pleasant and appropriate. Mother previously had twins in the NICU so familiar with NICU environment and expectation. Patient is named Deepali. Discussed home life.  Parents, Kimbelry and Srinath are  and have 5 year old twin mart Turpin and Ashlyn at home.  Discussed support system. Parents reported having family that is a support to them. Discussed preparation for infant. Mother stated she has crib, carseat, clothes and everything ready for Deepali at home. Discussed support available from NICU staff and SW.  Parents denied  need for assistance with transportation resources to visit the NICU.  SW answered questions about the NICU.  SW reinforced SW role to provide support to families and help assist with discharge planning for infant when ready for hospital discharge including possibility of home health care. Mother familiar with pediatric home health from her twins and agreeable to [facility] at home referral if ordered by MD.   Parents have chosen infant's doctor to be Dr. Negro Staton fter discharge. SW encouraged parents to call SW with further questions or concerns and provided SW contact information. SW will continue to follow and support family during NICU stay.       Plan  Recommendations to Discharge Planning:    Anticipate infant will not need post-hospital services. Necessary services are available.    Reinforced SW/CM role to provide support to families and help assist with discharge planning for infant when ready for hospital discharge.  Encouraged parents/family to call SW/CM with further questions or concerns. SW/CM will continue to follow and support family during infant's stay.    Refer to SW/CM flowsheet for goals and objective data.

## 2025-04-09 ENCOUNTER — RESULTS FOLLOW-UP (OUTPATIENT)
Dept: UROGYNECOLOGY | Facility: CLINIC | Age: 80
End: 2025-04-09

## 2025-04-09 LAB — BACTERIA UR CULT: NO GROWTH

## 2025-04-15 ENCOUNTER — TELEPHONE (OUTPATIENT)
Dept: UROGYNECOLOGY | Facility: CLINIC | Age: 80
End: 2025-04-15
Payer: MEDICARE

## 2025-04-15 NOTE — TELEPHONE ENCOUNTER
----- Message from Jenifer sent at 4/15/2025 10:52 AM CDT -----  Name of Who is Calling: Lorene  What is the request in detail: Pt daughter in law calling because she have some questions about the SUDS test.Please call back to further assist.   Can the clinic reply by [facility]: No  What Number to [facility] Back [facility] not [facility]:[phone]

## 2025-04-15 NOTE — TELEPHONE ENCOUNTER
Informed pt's daughter Lorene the 5/12/25 SUDS appt is for bladder testing and to see Dr Harkins to discuss surgery options. Lorene stated she schedule an appt for pt to see Dr Harkins Mon 4/21/25. Informed Lorene that appt was scheduled incorrectly due pt never was seen by Dr Harkins it would have to be scheduled as a new pt appt. The SUDS schedule was booked up for Mon 4/21/25. Lorene agreed to keep the schedule 5/12/25 appt. Call ended.

## 2025-05-12 ENCOUNTER — PROCEDURE VISIT (OUTPATIENT)
Dept: UROGYNECOLOGY | Facility: CLINIC | Age: 80
End: 2025-05-12
Payer: MEDICARE

## 2025-05-12 VITALS
HEART RATE: 83 BPM | SYSTOLIC BLOOD PRESSURE: 137 MMHG | BODY MASS INDEX: 24.32 KG/M2 | DIASTOLIC BLOOD PRESSURE: 83 MMHG | HEIGHT: 60 IN | WEIGHT: 123.88 LBS

## 2025-05-12 DIAGNOSIS — N39.46 MIXED INCONTINENCE URGE AND STRESS: ICD-10-CM

## 2025-05-12 LAB
BILIRUB SERPL-MCNC: NORMAL MG/DL
BLOOD URINE, POC: 50
CLARITY, POC UA: CLEAR
COLOR, POC UA: NORMAL
GLUCOSE UR QL STRIP: NORMAL
KETONES UR QL STRIP: NORMAL
LEUKOCYTE ESTERASE URINE, POC: NORMAL
NITRITE, POC UA: NORMAL
PH, POC UA: 5
PROTEIN, POC: NORMAL
SPECIFIC GRAVITY, POC UA: 1.02
UROBILINOGEN, POC UA: NORMAL

## 2025-05-12 PROCEDURE — 51797 INTRAABDOMINAL PRESSURE TEST: CPT | Mod: S$GLB,,, | Performed by: OBSTETRICS & GYNECOLOGY

## 2025-05-12 PROCEDURE — 52000 CYSTOURETHROSCOPY: CPT | Mod: 59,S$GLB,, | Performed by: OBSTETRICS & GYNECOLOGY

## 2025-05-12 PROCEDURE — 51741 ELECTRO-UROFLOWMETRY FIRST: CPT | Mod: 51,S$GLB,, | Performed by: OBSTETRICS & GYNECOLOGY

## 2025-05-12 PROCEDURE — 51728 CYSTOMETROGRAM W/VP: CPT | Mod: S$GLB,,, | Performed by: OBSTETRICS & GYNECOLOGY

## 2025-05-12 PROCEDURE — 99499 UNLISTED E&M SERVICE: CPT | Mod: S$GLB,,, | Performed by: OBSTETRICS & GYNECOLOGY

## 2025-05-12 PROCEDURE — 51784 ANAL/URINARY MUSCLE STUDY: CPT | Mod: 51,S$GLB,, | Performed by: OBSTETRICS & GYNECOLOGY

## 2025-05-12 PROCEDURE — 81002 URINALYSIS NONAUTO W/O SCOPE: CPT | Mod: S$GLB,,, | Performed by: OBSTETRICS & GYNECOLOGY

## 2025-05-12 RX ORDER — CIPROFLOXACIN 500 MG/1
500 TABLET, FILM COATED ORAL
Status: COMPLETED | OUTPATIENT
Start: 2025-05-12 | End: 2025-05-12

## 2025-05-12 RX ORDER — LIDOCAINE HYDROCHLORIDE 20 MG/ML
JELLY TOPICAL ONCE
Status: COMPLETED | OUTPATIENT
Start: 2025-05-12 | End: 2025-05-12

## 2025-05-12 RX ADMIN — CIPROFLOXACIN 500 MG: 500 TABLET, FILM COATED ORAL at 11:05

## 2025-05-12 RX ADMIN — LIDOCAINE HYDROCHLORIDE 5 ML: 20 JELLY TOPICAL at 11:05

## 2025-05-16 NOTE — PROCEDURES
TITLE OF OPERATION:  Complex cystometry.  Complex uroflowmetry.  Electromyography with surface electrodes.  Pressure voiding flow study.  Abdominal pressure measurement.  Leak point pressure measurement.    INDICATIONS:  Last HPI from 01/14/2025  1)  UI:  (--) LANCE.  (+) UUI for several months. Wears Poise underwear daily - wets through one. Daytime frequency: Q 1 hours.  Nocturia: Yes: 2/night.   (--) dysuria,  (--) hematuria,  (--) frequent UTIs.  (+) complete bladder emptying.     2)  POP:  Present. Patient presented to ED on 1/8/2025 2/2 complaints of bulging coming from her vagina. Per ED note, + uterine prolapse noted and discharged with referral to urogyn.  below introitus.  Symptoms:(--)   (--) vaginal bleeding. (--) vaginal discharge. (--) sexually active.  (--)  Vaginal dryness.  (--) vaginal estrogen use.   POP-Q:  Aa +2; Ba +5; C +2; Ap 0; Bp 0  Genital hiatus 5, perineal body 3 total vaginal length 8.   Pvr 110 mL     3)  BM:  (--) constipation/straining. Does not have a daily bowel movement, reports going every other day.  (--) chronic diarrhea. (--) hematochezia.  (--) fecal incontinence.  (--) fecal smearing/urgency.  (--) incomplete evacuation.     4) Urinary frequency  -- started a few months ago. No associated dysuria. Has not been to urgent care or PCP. Was started on Macrobid for UTI at ED on 1/8/2025 and has 3 doses left. No urine cx available      02/03/2025  Fit with #3 ring with support pessary     02/21/2025  #3 ring with support pessary inserted     02/26/2025  Pessary came out same day as insertion     Changes since last visit:  Doing well. Still has urinary frequency  Pessary is out --unsure when she lost it  Not using estrogen cream     Past Medical History:   Diagnosis Date    Hypertension      Past Surgical History:   Procedure Laterality Date    HYSTERECTOMY         PREOPERATIVE DIAGNOSIS:  1. 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  Midline cystocele    4. Rectocele    5. Vaginal dryness    6. Incomplete bladder emptying    7. Chronic constipation    8. Urinary frequency    9. Urethral polyp        POSTOPERATIVE DIAGNOSIS:  1. 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 Midline cystocele    4. Rectocele    5. Vaginal dryness    6. Incomplete bladder emptying    7. Chronic constipation    8. Urinary frequency    9. Urethral polyp vs caruncle   10. Concern for voiding dysfunction (Valsalva assist)  11.  Urodynamic stress incontinence    ANESTHESIA:  None.    SPECIMEN (BACTERIOLOGICAL, PATHOLOGICAL OR OTHER):  None.    PROSTHETIC DEVICE/IMPLANT:  None.    SURGEONS NARRATIVE:  A time out was performed in which the patient identity and procedure were confirmed.  Urodynamic evaluation was performed using a computerized system ([employer]  Uroflowmetry was performed on the patient in the sitting position without catheters in place.  Subsequent urodynamic testing was performed with the patient in the lithotomy position at 45 degrees. Air charged catheters were used with sterile water as the infusion medium. Vesical and abdominal (rectal) pressures were measured, and detrusor pressure was calculated. EMG activity was recorded with surface electrodes. During filling, room temperature sterile water was infused at a rate of 30 cubic centimeters per minute. The patient was asked cough after instillation of each 100cc volume. Two Valsalva leak point pressures and two cough leak point pressures were performed with the catheters in place at 300 cubic centimeters and again at maximum capacity. Valsalva leak point pressure was defined as the difference between vesical pressure at which leakage was noted (visualized at the external urethral meatus) and the baseline vesical pressure. Following urodynamic testing, a pressure flow study was performed with the patient in the sitting position. Vesical and  "abdominal pressures were monitored and detrusor pressures were calculated. After the pressure flow study, the catheters were then removed. The patient tolerated the procedure well.     Urine dipstick: neg.    1.  VOIDING PHASE:      a.  Uroflowmetry:  Voided Volume: 0 ml  Voided Time: 162 s  Average Flow Rate: 0.0 ml/s  Max Flow Rate: 0.0 ml/s  The Post Void Residual volume was 60 ml.    The overall configuration of this uroflow study was unable to be interpreted, as patient was unable to void.    b.  Pressure flow:  Prolapse reduction: Yes (pessary)  Max Flow Rate (ml/s): 17.4  Voided Volume (ml): 191  Void Time (mm:ss): 00:35  Flow Time (mm:ss): 00:31  Avg Flow Rate (ml/s): 6.1  Time to Max Flow (mm:ss): 01:04  Max Pdet (cmH2O): 14  Pdet at Max Flow (cmH2O): -30  Calculated Post Void Residual (ml): 125  MCTURITION:PATIENT UNABLE TO FINISH VOIDING ON CHAIR, PATIENT VOIDED 200 ML'S ON TOILET (IN HAT)"  Void initiated by Valsalva, unable to determine presence of detrusor contraction, as pves catheter was dislodged during study.   Urethral relaxation (EMG):  present.      The overall configuration of this pressure flow study was with concern for voiding dysfunction (Valsalva assist).      2.  FILLING PHASE:  Infused Volume: 316 ml  First Sensation: 108 ml  First Desire to Void: 176 ml  Strong Desire to Void: 239 ml  Max Capacity: 316 ml  Compliance (calculated)  approx 150 mL/cm H20  EMG activity during filling:   stable  Detrusor contractions observed: No.    3.  URETHRAL FUNCTION/STORAGE PHASE:    a.  WITH prolapse reduction:  A Cough Leak Point Pressure Study was performed at 150ml with a minimum leak point pressure of [id number].  A Cough Leak Point Pressure Study was performed at 300ml with a minimum leak point pressure of [id number].  A Valsalva Leak Point Pressure Study was performed at 150ml with a minimum leak point pressure of 17osC4W.  A Valsalva Leak Point Pressure Study was performed at 300ml with a minimum " leak point pressure of 11soB9O.    These findings are consistent with Positive urodynamic stress incontinence.    Urethra: 1-2 cm erythematous, fleshy, friable tissue originating from inferior-most urethral meatus (6 o'clock), concerning for caruncle vs polyp  POP-Q:  C+8, TVL 10.  Complete vaginal vault prolapse.     Assessment:  UF unable to be interpreted, as patient was unable to void for study.  PF with concern for voiding dysfunction (Valsalva assist).   Compliance normal.  Max capacity 316 mL.  DO (--).  LANCE (+).      Plan:  Cystocele/ vaginal vault prolapse  --failed pessary  --discussed surgical options: not worried about future intercourse   --plan colpocleisis + sling (UDS +LANCE)   --add Bx vs removal of urethral polyp vs caruncle (appears very distal at meatus)--understands may have to d/c with temp chang to allow healing   --clearance per PCP + labs/EKG   --schedule renal US for h/o incomplete emptying     2) Vaginal dryness (atrophy)  --continue vaginal estrogen cream--reminded to specifically place on urethral polyp vs caruncle  -- Use 1 gram of estrogen cream in vagina  twice a week thereafter.  Remove the applicator and use your finger (up to the second knuckle) to place a dime-sized amount on the inside of your vagina and then around the outer lips and opening.   -- apply Aquaphor or A&D ointment on bulge when wearing Depends/Poise underwear.     3. Incomplete bladder emptying  --PVR w/o POP reduction in clinic: 110 mL  --empties well on UDS today with POP reduction per pessary  --retroperitoneal ultrasound ordered but never scheduled--will add to preop plan     4. Will have someone call you to set up surgery.   ----------------------------    Title of Operation:   Cystourethroscopy.     INDICATIONS:  As above    PREOPERATIVE DIAGNOSIS  As above    POSTOPERATIVE DIAGNOSIS:   As above    Anesthesia:   2% Xylocaine gel.    Specimen (Bacteriological, Pathological or other):   None.     Prosthetic  Device/Implant:   None.     Surgeons Narrative:     After informed consent was obtained, the patient was placed in the lithotomy position. The urethral meatus was prepped with Betadine and 10 cubic centimeters of 2% Xylocaine gel were introduced into the urethra. A flexible cystourethroscope was introduced into the bladder. The bladder was distended with approximately 300 cubic centimeters of sterile water. A systematic survey was performed in which the bladder was surveyed using multiple sequential passes in a clockwise fashion from the bladder dome to the bladder base to the urethrovesical junction. The trigone and ureteral orifices were observed. The scope was then flipped back on itself, and the urethrovesical junction was viewed. A vaginal examining finger was then placed with pressure suburethrally at the urethrovesical junction as the telescope was withdrawn in order to perform positive pressure urethroscopy.  Standard maneuvers of cough, squeeze and Valsalva were performed. The telescope was then completely withdrawn.     Findings: Urethroscopy:  Normal--polyp vs caruncle does not extend into urethral tract. Cystoscopy:  Normal bladder mucosa, bilateral ureteral flow was noted.      Assessment: Essentially normal cystourethroscopy.      Plan: The patient will follow up with Dr. Harkins as scheduled.  See urodynamics note from 5- for further plan details.

## 2025-05-22 DIAGNOSIS — N99.3 VAGINAL VAULT PROLAPSE AFTER HYSTERECTOMY: Primary | ICD-10-CM

## 2025-05-22 DIAGNOSIS — N39.46 MIXED INCONTINENCE URGE AND STRESS: ICD-10-CM

## 2025-05-22 DIAGNOSIS — N36.2 URETHRAL POLYP: ICD-10-CM

## 2025-06-05 ENCOUNTER — HOSPITAL ENCOUNTER (OUTPATIENT)
Dept: RADIOLOGY | Facility: OTHER | Age: 80
Discharge: HOME | End: 2025-06-05
Attending: NURSE PRACTITIONER
Payer: MEDICARE

## 2025-06-05 DIAGNOSIS — R33.9 INCOMPLETE BLADDER EMPTYING: ICD-10-CM

## 2025-06-05 PROCEDURE — 76770 US EXAM ABDO BACK WALL COMP: CPT | Mod: TC

## 2025-06-05 PROCEDURE — 76770 US EXAM ABDO BACK WALL COMP: CPT | Mod: 26,,, | Performed by: RADIOLOGY

## 2025-07-09 ENCOUNTER — OFFICE VISIT (OUTPATIENT)
Dept: UROGYNECOLOGY | Facility: CLINIC | Age: 80
End: 2025-07-09
Payer: MEDICARE

## 2025-07-09 VITALS
HEIGHT: 60 IN | SYSTOLIC BLOOD PRESSURE: 141 MMHG | BODY MASS INDEX: 23.98 KG/M2 | DIASTOLIC BLOOD PRESSURE: 77 MMHG | WEIGHT: 122.13 LBS | HEART RATE: 73 BPM

## 2025-07-09 DIAGNOSIS — N39.46 MIXED INCONTINENCE URGE AND STRESS: ICD-10-CM

## 2025-07-09 DIAGNOSIS — Z01.818 PREOPERATIVE EXAM FOR GYNECOLOGIC SURGERY: ICD-10-CM

## 2025-07-09 DIAGNOSIS — N81.6 RECTOCELE: ICD-10-CM

## 2025-07-09 DIAGNOSIS — N89.8 VAGINAL DRYNESS: ICD-10-CM

## 2025-07-09 DIAGNOSIS — N99.3 VAGINAL VAULT PROLAPSE AFTER HYSTERECTOMY: ICD-10-CM

## 2025-07-09 DIAGNOSIS — N36.2 URETHRAL POLYP: ICD-10-CM

## 2025-07-09 DIAGNOSIS — N81.11 MIDLINE CYSTOCELE: ICD-10-CM

## 2025-07-09 PROCEDURE — 99499 UNLISTED E&M SERVICE: CPT | Mod: S$GLB,,, | Performed by: NURSE PRACTITIONER

## 2025-07-09 PROCEDURE — 87088 URINE BACTERIA CULTURE: CPT | Performed by: NURSE PRACTITIONER

## 2025-07-09 PROCEDURE — 99999 PR PBB SHADOW E&M-EST. PATIENT-LVL III: CPT | Mod: PBBFAC,,, | Performed by: NURSE PRACTITIONER

## 2025-07-09 NOTE — H&P (VIEW-ONLY)
Urogyn follow up  07/09/2025  .  [facility]  [address]  [facility] [id number]    Hanna Prince  [id number]  1945      Hanna Prince is a 80 y.o. here for a urogyn follow up for preop visit    Last HPI from 01/14/2025  1)  UI:  (--) LANCE.  (+) UUI for several months. Wears Poise underwear daily - wets through one. Daytime frequency: Q 1 hours.  Nocturia: Yes: 2/night.   (--) dysuria,  (--) hematuria,  (--) frequent UTIs.  (+) complete bladder emptying.     2)  POP:  Present. Patient presented to ED on 1/8/2025 2/2 complaints of bulging coming from her vagina. Per ED note, + uterine prolapse noted and discharged with referral to urogyn.  below introitus.  Symptoms:(--)   (--) vaginal bleeding. (--) vaginal discharge. (--) sexually active.  (--)  Vaginal dryness.  (--) vaginal estrogen use.   POP-Q:  Aa +2; Ba +5; C +2; Ap 0; Bp 0  Genital hiatus 5, perineal body 3 total vaginal length 8.   Pvr 110 mL     3)  BM:  (--) constipation/straining. Does not have a daily bowel movement, reports going every other day.  (--) chronic diarrhea. (--) hematochezia.  (--) fecal incontinence.  (--) fecal smearing/urgency.  (--) incomplete evacuation.     4) Urinary frequency  -- started a few months ago. No associated dysuria. Has not been to urgent care or PCP. Was started on Macrobid for UTI at ED on 1/8/2025 and has 3 doses left. No urine cx available      02/03/2025  Fit with #3 ring with support pessary    02/21/2025  #3 ring with support pessary inserted    02/26/2025  Pessary came out same day as insertion    04/07/2025  Doing well. Still has urinary frequency  Pessary is out --unsure when she lost it  Not using estrogen cream     05/12/2025  Suds     Urine dipstick: neg.     1.  VOIDING PHASE:       a.  Uroflowmetry:  Voided Volume: 0 ml  Voided Time: 162 s  Average Flow Rate: 0.0 ml/s  Max Flow Rate: 0.0 ml/s  The Post Void Residual volume was 60 ml.     The overall configuration of this  "uroflow study was unable to be interpreted, as patient was unable to void.     b.  Pressure flow:  Prolapse reduction: Yes (pessary)  Max Flow Rate (ml/s): 17.4  Voided Volume (ml): 191  Void Time (mm:ss): 00:35  Flow Time (mm:ss): 00:31  Avg Flow Rate (ml/s): 6.1  Time to Max Flow (mm:ss): 01:04  Max Pdet (cmH2O): 14  Pdet at Max Flow (cmH2O): -30  Calculated Post Void Residual (ml): 125  MCTURITION:PATIENT UNABLE TO FINISH VOIDING ON CHAIR, PATIENT VOIDED 200 ML'S ON TOILET (IN HAT)"  Void initiated by Valsalva, unable to determine presence of detrusor contraction, as pves catheter was dislodged during study.   Urethral relaxation (EMG):  present.       The overall configuration of this pressure flow study was with concern for voiding dysfunction (Valsalva assist).       2.  FILLING PHASE:  Infused Volume: 316 ml  First Sensation: 108 ml  First Desire to Void: 176 ml  Strong Desire to Void: 239 ml  Max Capacity: 316 ml  Compliance (calculated)  approx 150 mL/cm H20  EMG activity during filling:   stable  Detrusor contractions observed: No.     3.  URETHRAL FUNCTION/STORAGE PHASE:     a.  WITH prolapse reduction:  A Cough Leak Point Pressure Study was performed at 150ml with a minimum leak point pressure of 77ehJ4B.  A Cough Leak Point Pressure Study was performed at 300ml with a minimum leak point pressure of 373qrO8J.  A Valsalva Leak Point Pressure Study was performed at 150ml with a minimum leak point pressure of 52ilH9P.  A Valsalva Leak Point Pressure Study was performed at 300ml with a minimum leak point pressure of 97aoQ8N.     These findings are consistent with Positive urodynamic stress incontinence.     Urethra: 1-2 cm erythematous, fleshy, friable tissue originating from inferior-most urethral meatus (6 o'clock), concerning for caruncle vs polyp  POP-Q:  C+8, TVL 10.  Complete vaginal vault prolapse.      Assessment:  UF unable to be interpreted, as patient was unable to void for study.  PF with concern " for voiding dysfunction (Valsalva assist).   Compliance normal.  Max capacity 316 mL.  DO (--).  LANCE (+).       Cysto:    Findings: Urethroscopy:  Normal--polyp vs caruncle does not extend into urethral tract. Cystoscopy:  Normal bladder mucosa, bilateral ureteral flow was noted.      Assessment: Essentially normal cystourethroscopy.      06/05/2025  Retroperitoneal ultrasound  Right kidney: The right kidney measures 9.3 cm. No cortical thinning or loss of corticomedullary distinction. Resistive index measures 0.70.  No stones, mass, or hydronephrosis.  Left kidney: The left kidney measures 10.0 cm. No cortical thinning or loss of corticomedullary distinction.  Resistive index measures 0.72.  No stones, mass, or hydronephrosis.   The bladder is partially distended at the time of scanning and has an unremarkable appearance.   Impression:   Within normal limits       Changes since last visit:  Voiding every 2-3 hours during the day and denies nocturia  Denies constipation or straining.        Past Medical History:   Diagnosis Date    Hypertension        Past Surgical History:   Procedure Laterality Date    HYSTERECTOMY         No family history on file.    Social History     Socioeconomic History    Marital status: Single   Tobacco Use    Smoking status: Never    Smokeless tobacco: Never   Substance and Sexual Activity    Alcohol use: Yes     Comment: occ       Current Outpatient Medications   Medication Sig Dispense Refill    estradioL (ESTRACE) 0.01 % (0.1 mg/gram) vaginal cream Place 1 g vaginally once daily. 30 g 11    multivitamin (ONE DAILY MULTIVITAMIN) per tablet Take 1 tablet by mouth once daily.      ciprofloxacin HCl (CIPRO) 250 MG tablet Take 250 mg by mouth 2 (two) times daily.      donepeziL (ARICEPT) 10 MG tablet Take 10 mg by mouth every evening. (Patient not taking: Reported on 7/11/2025)      quinapril (ACCUPRIL) 10 MG tablet Take 10 mg by mouth every evening. (Patient not taking: Reported on  7/9/2025)       No current facility-administered medications for this visit.       Review of patient's allergies indicates:  No Known Allergies    Well woman:  Last pap: unknown  Last mammogram: 11/2022, normal  Colonoscopy: unknown  DEXA: 11/2022, 1. Interval development of osteopenia in the mean of the measured levels of the L1-L4 vertebra. The mean bone mineral density has decreased since the previous exam.   2. Minimal interval worsening of osteoporosis in the left femoral neck.    3. Osteopenia just below the threshold for osteoporosis in the right femoral neck. The bone mineral density has increased since the previous exam, when it was in the osteoporotic range.     ROS:  As per HPI.      Exam  BP (!) 141/77 (BP Location: Right arm, Patient Position: Sitting)   Pulse 73   Ht 5' (1.524 m)   Wt 55.4 kg (122 lb 2.2 oz)   BMI 23.85 kg/m²   General: alert and oriented, no acute distress  Respiratory: normal respiratory effort  Abd: soft, non-tender, non-distended    Pelvic--deferred            Impression  1. Preoperative exam for gynecologic surgery        2. Vaginal vault prolapse after hysterectomy        3. Urethral polyp        4. Mixed incontinence urge and stress  POCT URINE DIPSTICK WITHOUT MICROSCOPE    Urine Culture High Risk ($$)    CANCELED: Urine Culture High Risk ($$)      5. Rectocele        6. Midline cystocele        7. Vaginal dryness                  We reviewed the above issues and discussed options for short-term versus long-term management of her problems.   Plan:   Patient consented for colpocleisis, removal of urethral lesion, possible periurethral bulking, possible placement of synthetic midurethral sling, and cystourethroscopy.   R/B/A reviewed. Specific risks reviewed include:  infection, bleeding, need for blood transfusion, damage to surrounding structures, anesthesia risks, death, heart attack, stroke, mesh erosion/extrusion, pain, dyspareunia, urinary retention, voiding  dysfunction, urinary incontinence, exacerbation of urinary urge incontinence, and need for further surgeries.  We reviewed potential for failure of POP defect repair and need for future surgery, with no way of predicting risk.  She understands success rate of ASC approaches 85%.  Success rate of midurethral sling for LANCE was reviewed as 80-85%, and she understands that this will not necessarily impact other types of urinary incontinence.  Alternatives reviewed include: pessary/PT for POP and pessary/periurethral injections/PT/medication for LANCE.    T&S, urine HCG on DOS  Preoperative appointment with PCP or cardiology: Yes - cleared  VTE Prophylaxis:  heparin 5000 u SQ TID (1st dose 2hrs preop) + SCDs  Patient instructed on Magnesium citrate and chlorahexadine/dial soap prep to perform day before & AM of surgery.   Patient understands may have staged procedure for LANCE if urethral lesion removal more involved than anticipated  Urine cutlure today-- started cipro 250 mg twice daily 07/11/2025  Proceed to OR for above-mentioned procedure.       20 minutes were spent in face to face time with this patient  100 % of this time was spent in counseling and/or coordination of care      MADINA Weller-BC [facility]  Division of Female Pelvic Medicine and Reconstructive Surgery  Department of Obstetrics & Gynecology

## 2025-07-09 NOTE — PROGRESS NOTES
Urogyn follow up  07/09/2025  .  [facility]  [address]  [place] [id number]    Hanna Prince  [id number]  1945      Hanna Prince is a 80 y.o. here for a urogyn follow up for preop visit    Last HPI from 01/14/2025  1)  UI:  (--) LANCE.  (+) UUI for several months. Wears Poise underwear daily - wets through one. Daytime frequency: Q 1 hours.  Nocturia: Yes: 2/night.   (--) dysuria,  (--) hematuria,  (--) frequent UTIs.  (+) complete bladder emptying.     2)  POP:  Present. Patient presented to ED on 1/8/2025 2/2 complaints of bulging coming from her vagina. Per ED note, + uterine prolapse noted and discharged with referral to urogyn.  below introitus.  Symptoms:(--)   (--) vaginal bleeding. (--) vaginal discharge. (--) sexually active.  (--)  Vaginal dryness.  (--) vaginal estrogen use.   POP-Q:  Aa +2; Ba +5; C +2; Ap 0; Bp 0  Genital hiatus 5, perineal body 3 total vaginal length 8.   Pvr 110 mL     3)  BM:  (--) constipation/straining. Does not have a daily bowel movement, reports going every other day.  (--) chronic diarrhea. (--) hematochezia.  (--) fecal incontinence.  (--) fecal smearing/urgency.  (--) incomplete evacuation.     4) Urinary frequency  -- started a few months ago. No associated dysuria. Has not been to urgent care or PCP. Was started on Macrobid for UTI at ED on 1/8/2025 and has 3 doses left. No urine cx available      02/03/2025  Fit with #3 ring with support pessary    02/21/2025  #3 ring with support pessary inserted    02/26/2025  Pessary came out same day as insertion    04/07/2025  Doing well. Still has urinary frequency  Pessary is out --unsure when she lost it  Not using estrogen cream     05/12/2025  Suds     Urine dipstick: neg.     1.  VOIDING PHASE:       a.  Uroflowmetry:  Voided Volume: 0 ml  Voided Time: 162 s  Average Flow Rate: 0.0 ml/s  Max Flow Rate: 0.0 ml/s  The Post Void Residual volume was 60 ml.     The overall configuration of this  "uroflow study was unable to be interpreted, as patient was unable to void.     b.  Pressure flow:  Prolapse reduction: Yes (pessary)  Max Flow Rate (ml/s): 17.4  Voided Volume (ml): 191  Void Time (mm:ss): 00:35  Flow Time (mm:ss): 00:31  Avg Flow Rate (ml/s): 6.1  Time to Max Flow (mm:ss): 01:04  Max Pdet (cmH2O): 14  Pdet at Max Flow (cmH2O): -30  Calculated Post Void Residual (ml): 125  MCTURITION:PATIENT UNABLE TO FINISH VOIDING ON CHAIR, PATIENT VOIDED 200 ML'S ON TOILET (IN HAT)"  Void initiated by Valsalva, unable to determine presence of detrusor contraction, as pves catheter was dislodged during study.   Urethral relaxation (EMG):  present.       The overall configuration of this pressure flow study was with concern for voiding dysfunction (Valsalva assist).       2.  FILLING PHASE:  Infused Volume: 316 ml  First Sensation: 108 ml  First Desire to Void: 176 ml  Strong Desire to Void: 239 ml  Max Capacity: 316 ml  Compliance (calculated)  approx 150 mL/cm H20  EMG activity during filling:   stable  Detrusor contractions observed: No.     3.  URETHRAL FUNCTION/STORAGE PHASE:     a.  WITH prolapse reduction:  A Cough Leak Point Pressure Study was performed at 150ml with a minimum leak point pressure of [id number].  A Cough Leak Point Pressure Study was performed at 300ml with a minimum leak point pressure of [id number].  A Valsalva Leak Point Pressure Study was performed at 150ml with a minimum leak point pressure of 81sdG3D.  A Valsalva Leak Point Pressure Study was performed at 300ml with a minimum leak point pressure of 56lgD0A.     These findings are consistent with Positive urodynamic stress incontinence.     Urethra: 1-2 cm erythematous, fleshy, friable tissue originating from inferior-most urethral meatus (6 o'clock), concerning for caruncle vs polyp  POP-Q:  C+8, TVL 10.  Complete vaginal vault prolapse.      Assessment:  UF unable to be interpreted, as patient was unable to void for study.  PF with concern " for voiding dysfunction (Valsalva assist).   Compliance normal.  Max capacity 316 mL.  DO (--).  LANCE (+).       Cysto:    Findings: Urethroscopy:  Normal--polyp vs caruncle does not extend into urethral tract. Cystoscopy:  Normal bladder mucosa, bilateral ureteral flow was noted.      Assessment: Essentially normal cystourethroscopy.      06/05/2025  Retroperitoneal ultrasound  Right kidney: The right kidney measures 9.3 cm. No cortical thinning or loss of corticomedullary distinction. Resistive index measures 0.70.  No stones, mass, or hydronephrosis.  Left kidney: The left kidney measures 10.0 cm. No cortical thinning or loss of corticomedullary distinction.  Resistive index measures 0.72.  No stones, mass, or hydronephrosis.   The bladder is partially distended at the time of scanning and has an unremarkable appearance.   Impression:   Within normal limits       Changes since last visit:  Voiding every 2-3 hours during the day and denies nocturia  Denies constipation or straining.        Past Medical History:   Diagnosis Date    Hypertension        Past Surgical History:   Procedure Laterality Date    HYSTERECTOMY         No family history on file.    Social History     Socioeconomic History    Marital status: Single   Tobacco Use    Smoking status: Never    Smokeless tobacco: Never   Substance and Sexual Activity    Alcohol use: Yes     Comment: occ       Current Outpatient Medications   Medication Sig Dispense Refill    estradioL (ESTRACE) 0.01 % (0.1 mg/gram) vaginal cream Place 1 g vaginally once daily. 30 g 11    multivitamin (ONE DAILY MULTIVITAMIN) per tablet Take 1 tablet by mouth once daily.      ciprofloxacin HCl (CIPRO) 250 MG tablet Take 250 mg by mouth 2 (two) times daily.      donepeziL (ARICEPT) 10 MG tablet Take 10 mg by mouth every evening. (Patient not taking: Reported on 7/11/2025)      quinapril (ACCUPRIL) 10 MG tablet Take 10 mg by mouth every evening. (Patient not taking: Reported on  7/9/2025)       No current facility-administered medications for this visit.       Review of patient's allergies indicates:  No Known Allergies    Well woman:  Last pap: unknown  Last mammogram: 11/2022, normal  Colonoscopy: unknown  DEXA: 11/2022, 1. Interval development of osteopenia in the mean of the measured levels of the L1-L4 vertebra. The mean bone mineral density has decreased since the previous exam.   2. Minimal interval worsening of osteoporosis in the left femoral neck.    3. Osteopenia just below the threshold for osteoporosis in the right femoral neck. The bone mineral density has increased since the previous exam, when it was in the osteoporotic range.     ROS:  As per HPI.      Exam  BP (!) 141/77 (BP Location: Right arm, Patient Position: Sitting)   Pulse 73   Ht 5' (1.524 m)   Wt 55.4 kg (122 lb 2.2 oz)   BMI 23.85 kg/m²   General: alert and oriented, no acute distress  Respiratory: normal respiratory effort  Abd: soft, non-tender, non-distended    Pelvic--deferred            Impression  1. Preoperative exam for gynecologic surgery        2. Vaginal vault prolapse after hysterectomy        3. Urethral polyp        4. Mixed incontinence urge and stress  POCT URINE DIPSTICK WITHOUT MICROSCOPE    Urine Culture High Risk ($$)    CANCELED: Urine Culture High Risk ($$)      5. Rectocele        6. Midline cystocele        7. Vaginal dryness                  We reviewed the above issues and discussed options for short-term versus long-term management of her problems.   Plan:   Patient consented for colpocleisis, removal of urethral lesion, possible periurethral bulking, possible placement of synthetic midurethral sling, and cystourethroscopy.   R/B/A reviewed. Specific risks reviewed include:  infection, bleeding, need for blood transfusion, damage to surrounding structures, anesthesia risks, death, heart attack, stroke, mesh erosion/extrusion, pain, dyspareunia, urinary retention, voiding  dysfunction, urinary incontinence, exacerbation of urinary urge incontinence, and need for further surgeries.  We reviewed potential for failure of POP defect repair and need for future surgery, with no way of predicting risk.  She understands success rate of ASC approaches 85%.  Success rate of midurethral sling for LANCE was reviewed as 80-85%, and she understands that this will not necessarily impact other types of urinary incontinence.  Alternatives reviewed include: pessary/PT for POP and pessary/periurethral injections/PT/medication for LANCE.    T&S, urine HCG on DOS  Preoperative appointment with PCP or cardiology: Yes - cleared  VTE Prophylaxis:  heparin 5000 u SQ TID (1st dose 2hrs preop) + SCDs  Patient instructed on Magnesium citrate and chlorahexadine/dial soap prep to perform day before & AM of surgery.   Patient understands may have staged procedure for LANCE if urethral lesion removal more involved than anticipated  Urine cutlure today-- started cipro 250 mg twice daily 07/11/2025  Proceed to OR for above-mentioned procedure.       20 minutes were spent in face to face time with this patient  100 % of this time was spent in counseling and/or coordination of care      MADINA Weller-BC [facility]  Division of Female Pelvic Medicine and Reconstructive Surgery  Department of Obstetrics & Gynecology

## 2025-07-11 ENCOUNTER — TELEPHONE (OUTPATIENT)
Dept: UROGYNECOLOGY | Facility: CLINIC | Age: 80
End: 2025-07-11
Payer: MEDICARE

## 2025-07-11 RX ORDER — DONEPEZIL HYDROCHLORIDE 10 MG/1
10 TABLET, FILM COATED ORAL NIGHTLY
COMMUNITY

## 2025-07-11 RX ORDER — CIPROFLOXACIN 250 MG/1
250 TABLET, FILM COATED ORAL 2 TIMES DAILY
COMMUNITY
Start: 2025-07-07

## 2025-07-11 NOTE — TELEPHONE ENCOUNTER
"Pt's daughter in law states she saw these "preliminary results" in her MyChart and wanted guidance. Daughter-in-law states she spoke to pt's PCP and he prescribed Cipro X 6 days. She states they wanted to make JOE Coy NP aware and to see if there was any other advice.      Copied from CRM #[id number]. Topic: General Inquiry - Test Results  >> Jul 11, 2025 11:12 AM Jenifer wrote:  Name of Who is Calling: Lorene             What is the request in detail: Pt is calling to request lab results. Please contact to further discuss and advise.              Can the clinic reply by [facility]:No        What Number to Call Back if not [facility]:[phone]  "

## 2025-07-11 NOTE — PRE-PROCEDURE INSTRUCTIONS
PreOp Instructions given:   - Verbal medication information (what to hold and what to take)   - NPO guidelines (or as per your Surgeon)  NO SOLID FOOD, MILK OR MILK PRODUCTS 8 HOURS PRIOR TO SX START TIME. 2100  YOU MAY ONLY HAVE SIPS OF WATER WITH MORNING MEDICATIONS (1) HOUR PRIOR TO ARRIVAL TO HOSPITAL.   - Arrival place directions given; time to be given the day before procedure by the   Surgeon's Office 0830 DOSC  - Bathing with antibacterial soap   - Don't wear any jewelry or bring any valuables AM of surgery   - No makeup or moisturizer to face   - No perfume/cologne, powder, lotions or aftershave   Pt. verbalized understanding.   Pt denies any h/o Anesthesia/Sedation complications or side effects.

## 2025-07-12 LAB — BACTERIA UR CULT: ABNORMAL

## 2025-07-14 ENCOUNTER — TELEPHONE (OUTPATIENT)
Dept: UROGYNECOLOGY | Facility: CLINIC | Age: 80
End: 2025-07-14
Payer: MEDICARE

## 2025-07-14 ENCOUNTER — ANESTHESIA EVENT (OUTPATIENT)
Dept: SURGERY | Facility: HOSPITAL | Age: 80
End: 2025-07-14
Payer: MEDICARE

## 2025-07-14 NOTE — TELEPHONE ENCOUNTER
Notified pt to arrive at 8:30am tomorrow for surgery. Pt repeated time back to me & verbalized understanding.

## 2025-07-14 NOTE — ANESTHESIA PREPROCEDURE EVALUATION
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 female with PMHx dementia and vaginal prolapse.    No notes on file.     Patient now presents for the above procedure(s).       No current facility-administered medications for this encounter.        ________________________________________  No results found for this or any previous visit.    ________________________________________    Prev airway: None documented.       LDA: None documented.       Drips: None documented.      Problem List[1]    Review of patient's allergies indicates:  No Known Allergies    Current Inpatient Medications:       Medications Ordered Prior to Encounter[2]    Past Surgical History:   Procedure Laterality Date    HYSTERECTOMY         Social History:  Tobacco Use: Low Risk  (7/12/2025)    Patient History     Smoking Tobacco Use: Never     Smokeless Tobacco Use: Never     Passive Exposure: Not on file       Alcohol Use: Not on file       OBJECTIVE:     Vital Signs Range:  BMI Readings from Last 1 Encounters:   07/09/25 23.85 kg/m²               Significant Labs:    No results found for: "WBC", "HGB", "HCT", "PLT", "NA", "K", "CL", "CO2", "GLU", "BUN", "CREATININE", "MG", "PHOS", "CALCIUM", "ALBUMIN", " ""PROT", "ALKPHOS", "BILITOT", "AST", "ALT", "INR", "HGBA1C"     Please see Results Review for additional labs.     Diagnostic Studies: No relevant studies.    EKG:   No results found for this or any previous visit.    ECHO:  See subjective, if available.      ASSESSMENT/PLAN:       Pre-op Assessment    I have reviewed the Patient Summary Reports.     I have reviewed the Nursing Notes. I have reviewed the NPO Status.   I have reviewed the Medications.     Review of Systems  Anesthesia Hx:  No problems with previous Anesthesia   History of prior surgery of interest to airway management or planning:          Denies Family Hx of Anesthesia complications.    Denies Personal Hx of Anesthesia complications.                    Social:  Non-Smoker, No Alcohol Use       Hematology/Oncology:       -- Denies Anemia:                                  Cardiovascular:     Denies Pacemaker.  Denies Hypertension.   Denies MI.  Denies CAD.    Denies CABG/stent.             Patient not on beta blockers                          Pulmonary:    Denies COPD.  Denies Asthma.                    Renal/:   Denies Chronic Renal Disease.                Hepatic/GI:      Denies GERD.    Not Taking GLP-1 Agonists            Neurological:    Denies CVA.    Denies Seizures.                                Endocrine:  Denies Diabetes.         Denies Obesity / BMI > 30, Denies Morbid Obesity / BMI > 40      Physical Exam  General: Well nourished, Cooperative, Alert and Oriented    Airway:  Mallampati: II   Mouth Opening: Normal  TM Distance: Normal  Tongue: Normal  Neck ROM: Normal ROM    Dental:  Intact    Chest/Lungs:  Normal Respiratory Rate    Heart:  Rate: Normal  Rhythm: Regular Rhythm        Anesthesia Plan  Type of Anesthesia, risks & benefits discussed:    Anesthesia Type: Gen ETT, Gen Supraglottic Airway  Intra-op Monitoring Plan: Standard ASA Monitors  Post Op Pain Control Plan: multimodal analgesia and IV/PO Opioids PRN  Induction:  " IV  Airway Plan: Direct and Video, Post-Induction  Informed Consent: Informed consent signed with the Patient and all parties understand the risks and agree with anesthesia plan.  All questions answered.   ASA Score: 2  Day of Surgery Review of History & Physical: H&P Update referred to the surgeon/provider.    Ready For Surgery From Anesthesia Perspective.     .           [1]   Patient Active Problem List  Diagnosis    Vaginal vault prolapse after hysterectomy   [2]   No current facility-administered medications on file prior to encounter.     Current Outpatient Medications on File Prior to Encounter   Medication Sig Dispense Refill    ciprofloxacin HCl (CIPRO) 250 MG tablet Take 250 mg by mouth 2 (two) times daily.      donepeziL (ARICEPT) 10 MG tablet Take 10 mg by mouth every evening. (Patient not taking: Reported on 7/11/2025)      estradioL (ESTRACE) 0.01 % (0.1 mg/gram) vaginal cream Place 1 g vaginally once daily. 30 g 11    multivitamin (ONE DAILY MULTIVITAMIN) per tablet Take 1 tablet by mouth once daily.      quinapril (ACCUPRIL) 10 MG tablet Take 10 mg by mouth every evening. (Patient not taking: Reported on 7/9/2025)

## 2025-07-15 ENCOUNTER — HOSPITAL ENCOUNTER (OUTPATIENT)
Facility: HOSPITAL | Age: 80
Discharge: HOME | End: 2025-07-16
Attending: OBSTETRICS & GYNECOLOGY | Admitting: OBSTETRICS & GYNECOLOGY
Payer: MEDICARE

## 2025-07-15 ENCOUNTER — ANESTHESIA (OUTPATIENT)
Dept: SURGERY | Facility: HOSPITAL | Age: 80
End: 2025-07-15
Payer: MEDICARE

## 2025-07-15 DIAGNOSIS — N36.2 URETHRAL POLYP: ICD-10-CM

## 2025-07-15 DIAGNOSIS — N99.3 VAGINAL VAULT PROLAPSE AFTER HYSTERECTOMY: ICD-10-CM

## 2025-07-15 DIAGNOSIS — N81.9 VAGINAL VAULT PROLAPSE: ICD-10-CM

## 2025-07-15 DIAGNOSIS — N39.46 MIXED INCONTINENCE URGE AND STRESS: ICD-10-CM

## 2025-07-15 DIAGNOSIS — Z98.890 STATUS POST CYSTOSCOPY: Primary | ICD-10-CM

## 2025-07-15 LAB
ABORH RETYPE: NORMAL
INDIRECT COOMBS: NORMAL
RH BLD: NORMAL
SPECIMEN OUTDATE: NORMAL

## 2025-07-15 PROCEDURE — 57120 COLPOCLEISIS LE FORT TYPE: CPT | Mod: AS,51,,

## 2025-07-15 PROCEDURE — 86850 RBC ANTIBODY SCREEN: CPT | Performed by: OBSTETRICS & GYNECOLOGY

## 2025-07-15 PROCEDURE — 36000707: Performed by: OBSTETRICS & GYNECOLOGY

## 2025-07-15 PROCEDURE — 88307 TISSUE EXAM BY PATHOLOGIST: CPT | Mod: TC | Performed by: OBSTETRICS & GYNECOLOGY

## 2025-07-15 PROCEDURE — 25000003 PHARM REV CODE 250: Performed by: OBSTETRICS & GYNECOLOGY

## 2025-07-15 PROCEDURE — 63600175 PHARM REV CODE 636 W HCPCS

## 2025-07-15 PROCEDURE — 63600175 PHARM REV CODE 636 W HCPCS: Performed by: OBSTETRICS & GYNECOLOGY

## 2025-07-15 PROCEDURE — 57288 REPAIR BLADDER DEFECT: CPT | Mod: AS,,,

## 2025-07-15 PROCEDURE — 25000003 PHARM REV CODE 250: Performed by: NURSE ANESTHETIST, CERTIFIED REGISTERED

## 2025-07-15 PROCEDURE — 88307 TISSUE EXAM BY PATHOLOGIST: CPT | Mod: 26,,, | Performed by: PATHOLOGY

## 2025-07-15 PROCEDURE — 37000008 HC ANESTHESIA 1ST 15 MINUTES: Performed by: OBSTETRICS & GYNECOLOGY

## 2025-07-15 PROCEDURE — 94761 N-INVAS EAR/PLS OXIMETRY MLT: CPT

## 2025-07-15 PROCEDURE — 71000015 HC POSTOP RECOV 1ST HR: Performed by: OBSTETRICS & GYNECOLOGY

## 2025-07-15 PROCEDURE — 57288 REPAIR BLADDER DEFECT: CPT | Mod: ,,, | Performed by: OBSTETRICS & GYNECOLOGY

## 2025-07-15 PROCEDURE — 36000706: Performed by: OBSTETRICS & GYNECOLOGY

## 2025-07-15 PROCEDURE — 57120 COLPOCLEISIS LE FORT TYPE: CPT | Mod: 51,,, | Performed by: OBSTETRICS & GYNECOLOGY

## 2025-07-15 PROCEDURE — 63600175 PHARM REV CODE 636 W HCPCS: Performed by: NURSE ANESTHETIST, CERTIFIED REGISTERED

## 2025-07-15 PROCEDURE — 25000003 PHARM REV CODE 250

## 2025-07-15 PROCEDURE — C1771 REP DEV, URINARY, W/SLING: HCPCS | Performed by: OBSTETRICS & GYNECOLOGY

## 2025-07-15 PROCEDURE — 71000044 HC DOSC ROUTINE RECOVERY FIRST HOUR: Performed by: OBSTETRICS & GYNECOLOGY

## 2025-07-15 PROCEDURE — 53260 TREATMENT OF URETHRA LESION: CPT | Mod: XS,,, | Performed by: OBSTETRICS & GYNECOLOGY

## 2025-07-15 PROCEDURE — 37000009 HC ANESTHESIA EA ADD 15 MINS: Performed by: OBSTETRICS & GYNECOLOGY

## 2025-07-15 PROCEDURE — 71000016 HC POSTOP RECOV ADDL HR: Performed by: OBSTETRICS & GYNECOLOGY

## 2025-07-15 DEVICE — SLING FIT ADVANTAGE: Type: IMPLANTABLE DEVICE | Site: PELVIS | Status: FUNCTIONAL

## 2025-07-15 RX ORDER — CEFAZOLIN SODIUM 1 G/3ML
INJECTION, POWDER, FOR SOLUTION INTRAMUSCULAR; INTRAVENOUS
Status: DISCONTINUED | OUTPATIENT
Start: 2025-07-15 | End: 2025-07-15

## 2025-07-15 RX ORDER — ACETAMINOPHEN 325 MG/1
650 TABLET ORAL
Status: DISCONTINUED | OUTPATIENT
Start: 2025-07-15 | End: 2025-07-16 | Stop reason: HOSPADM

## 2025-07-15 RX ORDER — NALOXONE HCL 0.4 MG/ML
0.02 VIAL (ML) INJECTION
Status: DISCONTINUED | OUTPATIENT
Start: 2025-07-15 | End: 2025-07-16 | Stop reason: HOSPADM

## 2025-07-15 RX ORDER — CEFDINIR 300 MG/1
300 CAPSULE ORAL 2 TIMES DAILY
Qty: 20 CAPSULE | Refills: 0 | Status: SHIPPED | OUTPATIENT
Start: 2025-07-15 | End: 2025-07-25

## 2025-07-15 RX ORDER — SODIUM CHLORIDE 9 MG/ML
INJECTION, SOLUTION INTRAVENOUS CONTINUOUS
Status: DISCONTINUED | OUTPATIENT
Start: 2025-07-15 | End: 2025-07-16 | Stop reason: HOSPADM

## 2025-07-15 RX ORDER — TRAMADOL HYDROCHLORIDE 50 MG/1
50 TABLET, FILM COATED ORAL EVERY 6 HOURS PRN
Status: DISCONTINUED | OUTPATIENT
Start: 2025-07-15 | End: 2025-07-16 | Stop reason: HOSPADM

## 2025-07-15 RX ORDER — OXYCODONE HYDROCHLORIDE 5 MG/1
5 TABLET ORAL EVERY 4 HOURS PRN
Status: DISCONTINUED | OUTPATIENT
Start: 2025-07-15 | End: 2025-07-16 | Stop reason: HOSPADM

## 2025-07-15 RX ORDER — ONDANSETRON 8 MG/1
8 TABLET, ORALLY DISINTEGRATING ORAL EVERY 8 HOURS PRN
Status: DISCONTINUED | OUTPATIENT
Start: 2025-07-15 | End: 2025-07-16 | Stop reason: HOSPADM

## 2025-07-15 RX ORDER — ACETAMINOPHEN 500 MG
500 TABLET ORAL EVERY 6 HOURS
Qty: 60 TABLET | Refills: 0 | Status: SHIPPED | OUTPATIENT
Start: 2025-07-15

## 2025-07-15 RX ORDER — HEPARIN SODIUM 5000 [USP'U]/ML
5000 INJECTION, SOLUTION INTRAVENOUS; SUBCUTANEOUS ONCE
Status: COMPLETED | OUTPATIENT
Start: 2025-07-15 | End: 2025-07-15

## 2025-07-15 RX ORDER — METOCLOPRAMIDE HYDROCHLORIDE 5 MG/ML
5 INJECTION INTRAMUSCULAR; INTRAVENOUS EVERY 6 HOURS PRN
Status: DISCONTINUED | OUTPATIENT
Start: 2025-07-15 | End: 2025-07-16 | Stop reason: HOSPADM

## 2025-07-15 RX ORDER — PHENYLEPHRINE HYDROCHLORIDE 10 MG/ML
INJECTION INTRAVENOUS
Status: DISCONTINUED | OUTPATIENT
Start: 2025-07-15 | End: 2025-07-15

## 2025-07-15 RX ORDER — DONEPEZIL HYDROCHLORIDE 5 MG/1
10 TABLET, FILM COATED ORAL NIGHTLY
Status: DISCONTINUED | OUTPATIENT
Start: 2025-07-15 | End: 2025-07-16 | Stop reason: HOSPADM

## 2025-07-15 RX ORDER — LIDOCAINE HYDROCHLORIDE AND EPINEPHRINE 10; 10 UG/ML; MG/ML
INJECTION, SOLUTION INFILTRATION; PERINEURAL
Status: DISCONTINUED | OUTPATIENT
Start: 2025-07-15 | End: 2025-07-15 | Stop reason: HOSPADM

## 2025-07-15 RX ORDER — ROCURONIUM BROMIDE 10 MG/ML
INJECTION, SOLUTION INTRAVENOUS
Status: DISCONTINUED | OUTPATIENT
Start: 2025-07-15 | End: 2025-07-15

## 2025-07-15 RX ORDER — TRAMADOL HYDROCHLORIDE 50 MG/1
50 TABLET, FILM COATED ORAL EVERY 6 HOURS PRN
Qty: 20 TABLET | Refills: 0 | Status: SHIPPED | OUTPATIENT
Start: 2025-07-15

## 2025-07-15 RX ORDER — LIDOCAINE HYDROCHLORIDE 10 MG/ML
INJECTION, SOLUTION INTRAVENOUS
Status: DISCONTINUED | OUTPATIENT
Start: 2025-07-15 | End: 2025-07-15

## 2025-07-15 RX ORDER — DOCUSATE SODIUM 100 MG/1
100 CAPSULE, LIQUID FILLED ORAL 2 TIMES DAILY
Qty: 60 CAPSULE | Refills: 0 | Status: SHIPPED | OUTPATIENT
Start: 2025-07-15

## 2025-07-15 RX ORDER — DEXMEDETOMIDINE HYDROCHLORIDE 100 UG/ML
INJECTION, SOLUTION INTRAVENOUS
Status: DISCONTINUED | OUTPATIENT
Start: 2025-07-15 | End: 2025-07-15

## 2025-07-15 RX ORDER — HYDROMORPHONE HYDROCHLORIDE 1 MG/ML
0.4 INJECTION, SOLUTION INTRAMUSCULAR; INTRAVENOUS; SUBCUTANEOUS
Status: DISCONTINUED | OUTPATIENT
Start: 2025-07-15 | End: 2025-07-16 | Stop reason: HOSPADM

## 2025-07-15 RX ORDER — MUPIROCIN 20 MG/G
OINTMENT TOPICAL
Status: DISCONTINUED | OUTPATIENT
Start: 2025-07-15 | End: 2025-07-15 | Stop reason: HOSPADM

## 2025-07-15 RX ORDER — OXYCODONE HYDROCHLORIDE 5 MG/1
5 TABLET ORAL EVERY 4 HOURS PRN
Qty: 5 TABLET | Refills: 0 | Status: SHIPPED | OUTPATIENT
Start: 2025-07-15

## 2025-07-15 RX ORDER — CIPROFLOXACIN 250 MG/1
250 TABLET, FILM COATED ORAL 2 TIMES DAILY
Status: DISCONTINUED | OUTPATIENT
Start: 2025-07-15 | End: 2025-07-16 | Stop reason: HOSPADM

## 2025-07-15 RX ORDER — DEXAMETHASONE SODIUM PHOSPHATE 4 MG/ML
INJECTION, SOLUTION INTRA-ARTICULAR; INTRALESIONAL; INTRAMUSCULAR; INTRAVENOUS; SOFT TISSUE
Status: DISCONTINUED | OUTPATIENT
Start: 2025-07-15 | End: 2025-07-15

## 2025-07-15 RX ORDER — PROCHLORPERAZINE EDISYLATE 5 MG/ML
5 INJECTION INTRAMUSCULAR; INTRAVENOUS EVERY 6 HOURS PRN
Status: DISCONTINUED | OUTPATIENT
Start: 2025-07-15 | End: 2025-07-16 | Stop reason: HOSPADM

## 2025-07-15 RX ORDER — CEFAZOLIN 2 G/1
2 INJECTION, POWDER, FOR SOLUTION INTRAMUSCULAR; INTRAVENOUS
Status: DISCONTINUED | OUTPATIENT
Start: 2025-07-15 | End: 2025-07-15 | Stop reason: HOSPADM

## 2025-07-15 RX ORDER — HEPARIN SODIUM 5000 [USP'U]/ML
5000 INJECTION, SOLUTION INTRAVENOUS; SUBCUTANEOUS EVERY 8 HOURS
Status: DISCONTINUED | OUTPATIENT
Start: 2025-07-15 | End: 2025-07-16 | Stop reason: HOSPADM

## 2025-07-15 RX ORDER — MUPIROCIN 20 MG/G
OINTMENT TOPICAL 2 TIMES DAILY
Status: DISCONTINUED | OUTPATIENT
Start: 2025-07-15 | End: 2025-07-16 | Stop reason: HOSPADM

## 2025-07-15 RX ORDER — OXYCODONE HYDROCHLORIDE 5 MG/1
5 TABLET ORAL EVERY 6 HOURS PRN
Status: DISCONTINUED | OUTPATIENT
Start: 2025-07-15 | End: 2025-07-16 | Stop reason: HOSPADM

## 2025-07-15 RX ORDER — PHENAZOPYRIDINE HYDROCHLORIDE 100 MG/1
200 TABLET, FILM COATED ORAL EVERY 8 HOURS PRN
Status: DISCONTINUED | OUTPATIENT
Start: 2025-07-15 | End: 2025-07-16 | Stop reason: HOSPADM

## 2025-07-15 RX ORDER — ONDANSETRON HYDROCHLORIDE 2 MG/ML
4 INJECTION, SOLUTION INTRAVENOUS EVERY 12 HOURS PRN
Status: DISCONTINUED | OUTPATIENT
Start: 2025-07-15 | End: 2025-07-16 | Stop reason: HOSPADM

## 2025-07-15 RX ORDER — FAMOTIDINE 20 MG/1
20 TABLET, FILM COATED ORAL
Status: COMPLETED | OUTPATIENT
Start: 2025-07-15 | End: 2025-07-15

## 2025-07-15 RX ORDER — ACETAMINOPHEN 325 MG/1
650 TABLET ORAL EVERY 6 HOURS
Status: DISCONTINUED | OUTPATIENT
Start: 2025-07-15 | End: 2025-07-16 | Stop reason: HOSPADM

## 2025-07-15 RX ORDER — ACETAMINOPHEN 10 MG/ML
INJECTION, SOLUTION INTRAVENOUS
Status: DISCONTINUED | OUTPATIENT
Start: 2025-07-15 | End: 2025-07-15

## 2025-07-15 RX ORDER — FENTANYL CITRATE 50 UG/ML
INJECTION, SOLUTION INTRAMUSCULAR; INTRAVENOUS
Status: DISCONTINUED | OUTPATIENT
Start: 2025-07-15 | End: 2025-07-15

## 2025-07-15 RX ORDER — PROPOFOL 10 MG/ML
VIAL (ML) INTRAVENOUS
Status: DISCONTINUED | OUTPATIENT
Start: 2025-07-15 | End: 2025-07-15

## 2025-07-15 RX ORDER — DOCUSATE SODIUM 100 MG/1
100 CAPSULE, LIQUID FILLED ORAL 2 TIMES DAILY
Status: DISCONTINUED | OUTPATIENT
Start: 2025-07-15 | End: 2025-07-16 | Stop reason: HOSPADM

## 2025-07-15 RX ADMIN — PHENYLEPHRINE HYDROCHLORIDE 100 MCG: 10 INJECTION INTRAVENOUS at 03:07

## 2025-07-15 RX ADMIN — PROPOFOL 120 MG: 10 INJECTION, EMULSION INTRAVENOUS at 11:07

## 2025-07-15 RX ADMIN — ROCURONIUM BROMIDE 20 MG: 10 INJECTION, SOLUTION INTRAVENOUS at 12:07

## 2025-07-15 RX ADMIN — ROCURONIUM BROMIDE 20 MG: 10 INJECTION, SOLUTION INTRAVENOUS at 01:07

## 2025-07-15 RX ADMIN — SODIUM CHLORIDE: 9 INJECTION, SOLUTION INTRAVENOUS at 10:07

## 2025-07-15 RX ADMIN — PHENYLEPHRINE HYDROCHLORIDE 100 MCG: 10 INJECTION INTRAVENOUS at 12:07

## 2025-07-15 RX ADMIN — CEFAZOLIN 2 G: 330 INJECTION, POWDER, FOR SOLUTION INTRAMUSCULAR; INTRAVENOUS at 11:07

## 2025-07-15 RX ADMIN — HEPARIN SODIUM 5000 UNITS: 5000 INJECTION INTRAVENOUS; SUBCUTANEOUS at 10:07

## 2025-07-15 RX ADMIN — PROPOFOL 50 MG: 10 INJECTION, EMULSION INTRAVENOUS at 02:07

## 2025-07-15 RX ADMIN — DEXMEDETOMIDINE 12 MCG: 200 INJECTION, SOLUTION INTRAVENOUS at 03:07

## 2025-07-15 RX ADMIN — SODIUM CHLORIDE, SODIUM GLUCONATE, SODIUM ACETATE, POTASSIUM CHLORIDE, MAGNESIUM CHLORIDE, SODIUM PHOSPHATE, DIBASIC, AND POTASSIUM PHOSPHATE: .53; .5; .37; .037; .03; .012; .00082 INJECTION, SOLUTION INTRAVENOUS at 03:07

## 2025-07-15 RX ADMIN — PHENYLEPHRINE HYDROCHLORIDE 50 MCG: 10 INJECTION INTRAVENOUS at 12:07

## 2025-07-15 RX ADMIN — FENTANYL CITRATE 25 MCG: 50 INJECTION, SOLUTION INTRAMUSCULAR; INTRAVENOUS at 02:07

## 2025-07-15 RX ADMIN — FAMOTIDINE 20 MG: 20 TABLET, FILM COATED ORAL at 09:07

## 2025-07-15 RX ADMIN — SODIUM CHLORIDE: 9 INJECTION, SOLUTION INTRAVENOUS at 09:07

## 2025-07-15 RX ADMIN — PROPOFOL 30 MG: 10 INJECTION, EMULSION INTRAVENOUS at 02:07

## 2025-07-15 RX ADMIN — DEXAMETHASONE SODIUM PHOSPHATE 4 MG: 4 INJECTION, SOLUTION INTRAMUSCULAR; INTRAVENOUS at 11:07

## 2025-07-15 RX ADMIN — DEXMEDETOMIDINE 8 MCG: 200 INJECTION, SOLUTION INTRAVENOUS at 03:07

## 2025-07-15 RX ADMIN — MUPIROCIN: 20 OINTMENT TOPICAL at 09:07

## 2025-07-15 RX ADMIN — OXYCODONE HYDROCHLORIDE 5 MG: 5 TABLET ORAL at 04:07

## 2025-07-15 RX ADMIN — PHENYLEPHRINE HYDROCHLORIDE 100 MCG: 10 INJECTION INTRAVENOUS at 01:07

## 2025-07-15 RX ADMIN — ACETAMINOPHEN 1000 MG: 10 INJECTION INTRAVENOUS at 03:07

## 2025-07-15 RX ADMIN — MUPIROCIN: 20 OINTMENT TOPICAL at 10:07

## 2025-07-15 RX ADMIN — ROCURONIUM BROMIDE 40 MG: 10 INJECTION, SOLUTION INTRAVENOUS at 11:07

## 2025-07-15 RX ADMIN — SUGAMMADEX 200 MG: 100 INJECTION, SOLUTION INTRAVENOUS at 02:07

## 2025-07-15 RX ADMIN — LIDOCAINE HYDROCHLORIDE 20 MG: 10 INJECTION, SOLUTION INTRAVENOUS at 11:07

## 2025-07-15 RX ADMIN — FENTANYL CITRATE 50 MCG: 50 INJECTION, SOLUTION INTRAMUSCULAR; INTRAVENOUS at 11:07

## 2025-07-15 RX ADMIN — DOCUSATE SODIUM 100 MG: 100 CAPSULE, LIQUID FILLED ORAL at 10:07

## 2025-07-15 RX ADMIN — ACETAMINOPHEN 650 MG: 325 TABLET ORAL at 10:07

## 2025-07-15 RX ADMIN — DEXTROSE 2 G: 50 INJECTION, SOLUTION INTRAVENOUS at 03:07

## 2025-07-15 NOTE — OP NOTE
Date of Operation: 07/15/2025     Title of Operation:  1)  Total colpocleisis with colpectomy  2)  Placement of retropubic tension-free midurethral sling, Advantage Fit ([employer])  3)  Perineorrhaphy with high levator plication  4)  Excision of urethral lesion  5)  Cystourethroscopy     Indications for Surgery:  Last HPI from 01/14/2025  1)  UI:  (--) LANCE.  (+) UUI for several months. Wears Poise underwear daily - wets through one. Daytime frequency: Q 1 hours.  Nocturia: Yes: 2/night.   (--) dysuria,  (--) hematuria,  (--) frequent UTIs.  (+) complete bladder emptying.     2)  POP:  Present. Patient presented to ED on 1/8/2025 2/2 complaints of bulging coming from her vagina. Per ED note, + uterine prolapse noted and discharged with referral to urogyn.  below introitus.  Symptoms:(--)   (--) vaginal bleeding. (--) vaginal discharge. (--) sexually active.  (--)  Vaginal dryness.  (--) vaginal estrogen use.   POP-Q:  Aa +2; Ba +5; C +2; Ap 0; Bp 0  Genital hiatus 5, perineal body 3 total vaginal length 8.   Pvr 110 mL     3)  BM:  (--) constipation/straining. Does not have a daily bowel movement, reports going every other day.  (--) chronic diarrhea. (--) hematochezia.  (--) fecal incontinence.  (--) fecal smearing/urgency.  (--) incomplete evacuation.     4) Urinary frequency  -- started a few months ago. No associated dysuria. Has not been to urgent care or PCP. Was started on Macrobid for UTI at ED on 1/8/2025 and has 3 doses left. No urine cx available      02/03/2025  Fit with #3 ring with support pessary     02/21/2025  #3 ring with support pessary inserted     02/26/2025  Pessary came out same day as insertion     04/07/2025  Doing well. Still has urinary frequency  Pessary is out --unsure when she lost it  Not using estrogen cream      05/12/2025  Suds     Urine dipstick: neg.     1.  VOIDING PHASE:       a.  Uroflowmetry:  Voided Volume: 0 ml  Voided Time: 162 s  Average Flow Rate: 0.0 ml/s  Max  "Flow Rate: 0.0 ml/s  The Post Void Residual volume was 60 ml.     The overall configuration of this uroflow study was unable to be interpreted, as patient was unable to void.     b.  Pressure flow:  Prolapse reduction: Yes (pessary)  Max Flow Rate (ml/s): 17.4  Voided Volume (ml): 191  Void Time (mm:ss): 00:35  Flow Time (mm:ss): 00:31  Avg Flow Rate (ml/s): 6.1  Time to Max Flow (mm:ss): 01:04  Max Pdet (cmH2O): 14  Pdet at Max Flow (cmH2O): -30  Calculated Post Void Residual (ml): 125  MCTURITION:PATIENT UNABLE TO FINISH VOIDING ON CHAIR, PATIENT VOIDED 200 ML'S ON TOILET (IN HAT)"  Void initiated by Valsalva, unable to determine presence of detrusor contraction, as pves catheter was dislodged during study.   Urethral relaxation (EMG):  present.       The overall configuration of this pressure flow study was with concern for voiding dysfunction (Valsalva assist).       2.  FILLING PHASE:  Infused Volume: 316 ml  First Sensation: 108 ml  First Desire to Void: 176 ml  Strong Desire to Void: 239 ml  Max Capacity: 316 ml  Compliance (calculated)  approx 150 mL/cm H20  EMG activity during filling:   stable  Detrusor contractions observed: No.     3.  URETHRAL FUNCTION/STORAGE PHASE:     a.  WITH prolapse reduction:  A Cough Leak Point Pressure Study was performed at 150ml with a minimum leak point pressure of 10lgL9O.  A Cough Leak Point Pressure Study was performed at 300ml with a minimum leak point pressure of 431ssQ0U.  A Valsalva Leak Point Pressure Study was performed at 150ml with a minimum leak point pressure of 25fmG5L.  A Valsalva Leak Point Pressure Study was performed at 300ml with a minimum leak point pressure of 56waV7W.     These findings are consistent with Positive urodynamic stress incontinence.     Urethra: 1-2 cm erythematous, fleshy, friable tissue originating from inferior-most urethral meatus (6 o'clock), concerning for caruncle vs polyp  POP-Q:  C+8, TVL 10.  Complete vaginal vault prolapse.    "   Assessment:  UF unable to be interpreted, as patient was unable to void for study.  PF with concern for voiding dysfunction (Valsalva assist).   Compliance normal.  Max capacity 316 mL.  DO (--).  LANCE (+).       Cysto:    Findings: Urethroscopy:  Normal--polyp vs caruncle does not extend into urethral tract. Cystoscopy:  Normal bladder mucosa, bilateral ureteral flow was noted.      Assessment: Essentially normal cystourethroscopy.       06/05/2025  Retroperitoneal ultrasound  Right kidney: The right kidney measures 9.3 cm. No cortical thinning or loss of corticomedullary distinction. Resistive index measures 0.70.  No stones, mass, or hydronephrosis.  Left kidney: The left kidney measures 10.0 cm. No cortical thinning or loss of corticomedullary distinction.  Resistive index measures 0.72.  No stones, mass, or hydronephrosis.   The bladder is partially distended at the time of scanning and has an unremarkable appearance.   Impression:   Within normal limits        Changes since last visit:  Voiding every 2-3 hours during the day and denies nocturia  Denies constipation or straining.     Preoperative Diagnosis:  1. 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 Midline cystocele    4. Rectocele    5. Vaginal dryness    6. Incomplete bladder emptying    7. Chronic constipation    8. Urinary frequency    9. Urethral polyp vs caruncle   10. Concern for voiding dysfunction (Valsalva assist)  11.  Urodynamic stress incontinence    Postoperative Diagnosis:  1. 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 Midline cystocele    4. Rectocele    5. Vaginal dryness    6. Incomplete bladder emptying    7. Chronic constipation    8. Urinary frequency    9. Urethral polyp vs caruncle   10. Concern for voiding dysfunction (Valsalva assist)  11.  Urodynamic stress incontinence    Anesthesia:  General endotracheal anesthesia.  Additionally,  a  dilute solution of 1% lidocaine with epinephrine was injected vaginally for local anesthesia.     Specimen (Bacteriological, Pathological or other):  Excised urethral tissue, 1 cm     Vaginal Packing (type and #):           none     Prosthetic Device/Implant:  1)  Advantage Fit.  LOT# [id number] .       Surgeons Narrative:     Surgeon: Agustin Harkins MD     Assistants:  Yaniv Burkett MD ([id number]).  ADITYA Cabral (insufficient resident assistance).      Intravenous Fluids:  1000 mL     Estimated Blood Loss:  150 mL     Urine Output:  475 mL     Counts:  Sponge, lap, needle counts correct x 2.     Drains: Leonard catheter.     Disposition:  The patient was sent to the PACU in stable condition.     Findings:     1.  On exam under anesthesia,  normal external female genitalia. There was a 1 cm tongue of mucosal tissue extending from the posterior urethral meatus, just left of midline, concerning for caruncle vs partial prolapse.  There was prolapse as previously noted in clinic: complete vaginal vault prolapse.  Uterus and cervix: Absent  Adnexa: non palpable     2.  On cystoscopy, the bladder mucosa was Normal.  After colpocleisis, excision of urethral lesion, and sling placement, there was no suture or mesh within the bladder mucosa.  The ureteral orifices were visualized bilaterally with (+) noted good efflux x 2. On a systematic survey of the bladder dome to the base of the urethrovesical junction, there were not other abnormalities noted. The urethra was normal on retraction of the scope.     3.  Rectal exam:  At the close of the case, rectal exam was performed.  There was no suture, mesh, or other injury noted on exam.  No obvious masses were palpated.      Description of procedure:     The patient was identified in the preoperative area where informed consent was confirmed, and she was taken to the operating room where an adequate level of general anesthesia was obtained.  The patient was positioned in  lithotomy position with legs in Ebenezer stirrups. Care was taken to avoid joint hyperflexion or hyperextension, and all extremity surfaces were carefully padded so as to minimize risk of neurologic injury. Intravenous antibiotics were administered preoperatively. Sequential compression devices were applied to the patient's lower extremities preoperatively and heparin was administered subcutaneously for VTE prophylaxis.  Surgical time-out was performed, where the patient was identified and procedures confirmed.  An examination under anesthesia was performed with findings described as above.  The patient's abdomen, perineum, and vagina were sterilely prepped and draped. A chang catheter was placed in the bladder for drainage.       At this point, the apex of the vaginal epithelial was placed on traction with Allis clamps. A marking pen was used to guillermina the margins of the excision anteriorly, posteriorly, and bilaterally. Anteriorly, the margin was made approximately 2 cm below the urethrovesical junction. The area of prolapsed tissue was tented apically to mimic surgical correction, and a site along the posterior vagina that mirrored that made along the anterior vagina was marked, establishing the posterior margin of resection. Finally, laterally marks were made, along the vaginal epithelium at 3 and 9 o'clock , approximately 2-3 cm within the hymenal remnant, indicating the lateral boundaries for resection.    With the area of resection demarcated, attention was turned to the resection of the anterior portion of tissue first. The vaginal mucosa was injected with dilute lidocaine + epinephrine just below the epithelium. A scalpel was used to incise the vaginal epithelium horizontally between the two Allis clamps at the vaginal apex and vertically along the midline of the anterior vaginal epithelium to the marked margin 2 cm below the urethrovesical junction. Metzenbaum scissors were used to dissect the vaginal  epithelium away from the fibromuscular vaginal wall. We continued this process, one quadrant at a time, until the entire demarcated area along the anterior aspect was removed. Excellent hemostasis was ensured after this segment using Bovie cautery and suture. Next, attention was turned to the posterior aspect of the prolapse. Again, the vaginal mucosa was injected with dilute lidocaine + epineprhine along the posterior portion of the vagina. Using a scapel, a vertical midline incision was extended from the initial horizontal incision at the apex to the proximal-most border of the posterior resection, which had been previously marked. Again, Metzenbaum scissors were used to dissect the vaginal epithelium away from the fibromuscular vaginal wall one quadrant at a time.  The posterior rectangle was grasped at the lateral margins with Allis clamps. Scalpel was used to make an incision along the vaginal epithelium. Metzenbaum scissors were used to dissect vaginal epithelium from the fibromuscular vaginal wall, removing the entire aspect of the demarcated rectangle. Hemostasis was achieved using Bovie cautery and suture after this.    After the entire area of demarcated vaginal epithelium had been resected cicumferentially, we began the colpectomy. Using 0-vicryl suture and starting at the vaginal apex, a series of purse string stitches were placed along the vaginal mucosa causing gradual inversion of the vagina after tying down each suture. This process was continued until the vaginal tissue had been inverted to the point that the epithelial margins were able to be easily approximated to close the defect. The epithelial incision was closed with a running locking suture of 2-0 vicryl.      We then proceeded with excision of urethral lesion.  The area of excess mucosal tissue was identified along the posterior urethral meatus, just left of midline. This was excised with gentle electrocautery, resulting in alignment of the  remaining urethral mucosa with the meatus' epithelium.  The specimen was handed to pathology for further evaluation. Using interrupted sutures of 4-0 monocryl, the free edges of the urethral mucosa were approximated to the posterior meatal epithelium so that the area of dissection was reattached to its normal, anatomical position.  Excellent hemostasis and cosmesis was noted.      As the area of dissection for the urethral lesions was far from the midurethral and focally located at the meatus, we then proceeded with placement of the Advantage Fit midurethral sling. The skin was marked just above the symphysis pubis, 2 cm laterally on either side of the midline indicating the exit sites for the trocars.  The Leonard catheter was palpated at the urethrovesical junction, and 2 Allis clamps were placed at the anterior vaginal wall along the midurethra. The Leonard catheter was removed from the patient's bladder. The vaginal epithelium between the two Allis clamps was injected with the 1% lidocaine with epinephrine.  Metzenbaum scissors were used to dissect the vaginal epithelium off the underlying pubocervical muscular tissue in the direction of the angle formed between the ischiopubic ramus and the pubic bone bilaterally. The rigid catheter guide was used to deviate the bladder neck and urethra to the patient's left while the right trocar was advanced to the dissected tract in the patient's right side.  Once the urogenital membrane was perforated, the trocar and handle were reoriented in the sagittal plane and the tip of the trocar was advanced through the retropubic space in intimate proximity to the pubic bone.  The trocar was then advanced through the skin approximately 2 cm to the right of the midline just above the pubic symphysis. The passage of the Advantage Fit trocar was repeated on the patient's left hand side in a similar fashion, using the catheter guide to deviate the bladder neck to the right.  At this point,  "cystourethroscopy was performed. Cystoscopy was negative for injury after infusion of at least 300 mL in the bladder.  The ureteral orifices were noted to have good efflux bilaterally.  The bladder was then drained. With a #10 Hegar dilator placed between the sling mesh and the urethra, the arms of the sling were elevated above the pubic symphysis and the plastic sheaths were removed from the mesh arms.  Excess mesh was trimmed beneath the suprapubic skin.  The Hegar dilator ensured that the mesh sling was placed in a tension-free manner.  The vaginal mucosa overlying the sling mesh was closed with a several mattress stitches of 2-0 Vicryl with excellent hemostasis noted.  The suprapubic incisions were closed with dermabond.    At this point, we turned our attention to performing the perineorrhaphy. A "amador" of perineal skin was marked, extended from hymen remnant to hymen remnant and onto the perineum. This was injected with dilute lidocaine + epinephrine and excised sharply. We then marked and excised a "chimney", extending from the amador to the colpocleisis closure. We then closed the deep tissues with interrupted sutures of 0 vicryl, reconstructing the perineal body and plicating the distal vagina. The skin was then closed with 2-0 Vicryl and the perineoplasty incision was closed with 2-0 Vicryl. After completion of the perineorrhaphy, a running, locking stitch of vicryl suture was used to close the colpocleisis incision. After closure of all epithelial incisions, the area was completely hemostatic. The vagina was noted to have about 2 cm of depth centrally and 2 cm of depth at the lateral-most aspects. Genital Hiatus was approx 2 cm.      Next, we performed cystourethroscopy with findings as noted above. The survey was normal without any obvious defect. After removing the cystoscope, the chang catheter was replaced. There was approximately two centimeters depth from the hymenal ring to the area of the " incision.     At the close of the case, all counts were correct x2.  The vagina was irrigated, and all irrigants were removed.  The Chang was in place and draining well.  The patient was awakened from general endotracheal anesthesia and was taken to the Recovery Room in stable condition.  She tolerated the procedure well.    The chang will be left in place at least 1 week to decrease changes for urethral agglutination or stenosis at the site of lesion excision.      I was present and scrubbed for the entire procedure.

## 2025-07-15 NOTE — PLAN OF CARE
Pt aaox3, disoriented to time. Pt aware of procedure and type of procedure she is having done today. Pt reports urinary urgency. Pt's daughter at bedside reports patient is forgetful but lives independently with children living close by. Preop safety  questions answered by pt and daughter. Bed low and locked, siderails up x2. Call light is within reach.

## 2025-07-15 NOTE — INTERVAL H&P NOTE
Hanna Prince is 80 y.o.  presenting for scheduled colpocleisis w/ midurethral sling, cystoscopy and possible A/P repair    Temp:  [97.1 °F (36.2 °C)] 97.1 °F (36.2 °C)  Pulse:  [69-72] 69  Resp:  [16] 16  SpO2:  [95 %] 95 %  BP: (158)/(83) 158/83    General: NAD, alert, oriented, cooperative  HEENT: NCAT, EOM grossly intact  Lungs: Normal WOB  Heart: regular rate  Abdomen: soft, nondistended, nontender, no rebound or guarding    Consents in chart. Pre-operative heparin ordered. All questions answered and concerns addressed. To OR for planned procedure.

## 2025-07-15 NOTE — ANESTHESIA PROCEDURE NOTES
Intubation    Date/Time: 7/15/2025 11:27 AM    Performed by: Axel Clements MD  Authorized by: Venice Cabezas MD    Intubation:     Induction:  Intravenous    Intubated:  Postinduction    Mask Ventilation:  Easy mask    Attempts:  1    Attempted By:  Resident anesthesiologist    Method of Intubation:  Direct    Blade:  Estela 3    Laryngeal View Grade: Grade IIA - cords partially seen      Difficult Airway Encountered?: No      Complications:  None    Airway Device:  Oral endotracheal tube    Airway Device Size:  7.0    Style/Cuff Inflation:  Cuffed (inflated to minimal occlusive pressure)    Tube secured:  22    Secured at:  The lips    Placement Verified By:  Capnometry    Complicating Factors:  None    Findings Post-Intubation:  BS equal bilateral and atraumatic/condition of teeth unchanged

## 2025-07-15 NOTE — TRANSFER OF CARE
Anesthesia Transfer of Care Note    Patient: Hanna Prince    Procedure(s) Performed: Procedure(s) (LRB):  COLPOCLEISIS (N/A)  SLING, MIDURETHRAL (N/A)  CYSTOSCOPY (N/A)  EXCISION, LESION, URETHRA (N/A)  PERINEOPLASTY (N/A)    Patient location: [facility]    Anesthesia Type: general    Transport from OR: Transported from OR on 6-10 L/min O2 by face mask with adequate spontaneous ventilation    Post pain: adequate analgesia    Post assessment: no apparent anesthetic complications and tolerated procedure well    Post vital signs: stable    Level of consciousness: sedated and responds to stimulation    Nausea/Vomiting: no nausea/vomiting    Complications: none    Transfer of care protocol was followed      Last vitals: Visit Vitals  BP (!) 158/83 (BP Location: Right arm, Patient Position: Lying)   Pulse 69   Temp 36.4 °C (97.5 °F) (Temporal)   Resp 16   Ht 5' (1.524 m)   Wt 55.3 kg (121 lb 14.6 oz)   SpO2 95%   Breastfeeding No   BMI 23.81 kg/m²

## 2025-07-16 ENCOUNTER — HOSPITAL ENCOUNTER (EMERGENCY)
Facility: HOSPITAL | Age: 80
Discharge: HOME | End: 2025-07-16
Attending: EMERGENCY MEDICINE
Payer: MEDICARE

## 2025-07-16 VITALS
HEART RATE: 80 BPM | SYSTOLIC BLOOD PRESSURE: 173 MMHG | OXYGEN SATURATION: 97 % | BODY MASS INDEX: 23.75 KG/M2 | DIASTOLIC BLOOD PRESSURE: 101 MMHG | RESPIRATION RATE: 20 BRPM | WEIGHT: 121 LBS | TEMPERATURE: 98 F | HEIGHT: 60 IN

## 2025-07-16 VITALS
TEMPERATURE: 98 F | BODY MASS INDEX: 23.94 KG/M2 | HEIGHT: 60 IN | RESPIRATION RATE: 18 BRPM | DIASTOLIC BLOOD PRESSURE: 79 MMHG | OXYGEN SATURATION: 98 % | HEART RATE: 71 BPM | WEIGHT: 121.94 LBS | SYSTOLIC BLOOD PRESSURE: 131 MMHG

## 2025-07-16 DIAGNOSIS — T83.9XXA PROBLEM WITH FOLEY CATHETER, INITIAL ENCOUNTER: Primary | ICD-10-CM

## 2025-07-16 PROBLEM — N36.9 URETHRAL LESION: Status: ACTIVE | Noted: 2025-07-16

## 2025-07-16 LAB
ABSOLUTE EOSINOPHIL (OHS): 0 K/UL
ABSOLUTE MONOCYTE (OHS): 0.75 K/UL (ref 0.3–1)
ABSOLUTE NEUTROPHIL COUNT (OHS): 7.74 K/UL (ref 1.8–7.7)
BASOPHILS # BLD AUTO: 0.02 K/UL
BASOPHILS NFR BLD AUTO: 0.2 %
ERYTHROCYTE [DISTWIDTH] IN BLOOD BY AUTOMATED COUNT: 13.9 % (ref 11.5–14.5)
HCT VFR BLD AUTO: 36.7 % (ref 37–48.5)
HGB BLD-MCNC: 11.8 GM/DL (ref 12–16)
IMM GRANULOCYTES # BLD AUTO: 0.02 K/UL (ref 0–0.04)
IMM GRANULOCYTES NFR BLD AUTO: 0.2 % (ref 0–0.5)
LYMPHOCYTES # BLD AUTO: 1.27 K/UL (ref 1–4.8)
MCH RBC QN AUTO: 28.6 PG (ref 27–31)
MCHC RBC AUTO-ENTMCNC: 32.2 G/DL (ref 32–36)
MCV RBC AUTO: 89 FL (ref 82–98)
NUCLEATED RBC (/100WBC) (OHS): 0 /100 WBC
PLATELET # BLD AUTO: 261 K/UL (ref 150–450)
PMV BLD AUTO: 10.4 FL (ref 9.2–12.9)
RBC # BLD AUTO: 4.12 M/UL (ref 4–5.4)
RELATIVE EOSINOPHIL (OHS): 0 %
RELATIVE LYMPHOCYTE (OHS): 13 % (ref 18–48)
RELATIVE MONOCYTE (OHS): 7.7 % (ref 4–15)
RELATIVE NEUTROPHIL (OHS): 78.9 % (ref 38–73)
WBC # BLD AUTO: 9.8 K/UL (ref 3.9–12.7)

## 2025-07-16 PROCEDURE — 85025 COMPLETE CBC W/AUTO DIFF WBC: CPT | Performed by: OBSTETRICS & GYNECOLOGY

## 2025-07-16 PROCEDURE — 36415 COLL VENOUS BLD VENIPUNCTURE: CPT | Performed by: OBSTETRICS & GYNECOLOGY

## 2025-07-16 PROCEDURE — 51702 INSERT TEMP BLADDER CATH: CPT

## 2025-07-16 PROCEDURE — 25000003 PHARM REV CODE 250

## 2025-07-16 PROCEDURE — 99283 EMERGENCY DEPT VISIT LOW MDM: CPT | Mod: 25

## 2025-07-16 PROCEDURE — 25000003 PHARM REV CODE 250: Performed by: OBSTETRICS & GYNECOLOGY

## 2025-07-16 PROCEDURE — 63600175 PHARM REV CODE 636 W HCPCS: Performed by: OBSTETRICS & GYNECOLOGY

## 2025-07-16 RX ADMIN — ACETAMINOPHEN 650 MG: 325 TABLET ORAL at 04:07

## 2025-07-16 RX ADMIN — HEPARIN SODIUM 5000 UNITS: 5000 INJECTION INTRAVENOUS; SUBCUTANEOUS at 05:07

## 2025-07-16 RX ADMIN — ACETAMINOPHEN 650 MG: 325 TABLET ORAL at 08:07

## 2025-07-16 RX ADMIN — DONEPEZIL HYDROCHLORIDE 10 MG: 5 TABLET, FILM COATED ORAL at 12:07

## 2025-07-16 RX ADMIN — DOCUSATE SODIUM 100 MG: 100 CAPSULE, LIQUID FILLED ORAL at 08:07

## 2025-07-16 RX ADMIN — CIPROFOLXACIN 250 MG: 250 TABLET ORAL at 08:07

## 2025-07-16 RX ADMIN — OXYCODONE HYDROCHLORIDE 5 MG: 5 TABLET ORAL at 12:07

## 2025-07-16 RX ADMIN — CIPROFOLXACIN 250 MG: 250 TABLET ORAL at 12:07

## 2025-07-16 NOTE — DISCHARGE INSTRUCTIONS
Your Leonard catheter was replaced today    Follow up with Urogyn at scheduled appointment on 8/18/2025    Strict ED precautions given to return immediately for new, worsening, or concerning symptoms

## 2025-07-16 NOTE — MEDICAL/APP STUDENT
Progress Note  Gynecology    Admit Date: 7/15/2025  LOS: 1    Reason for Admission:  post op management and care    SUBJECTIVE:     Hanna Prince is a 80 y.o.  who is POD #1 s/p colpocleisis, mid-urethral sling, excision of urethral lesion, cystourethroscopy, perineorrhaphy for the treatment of vaginal vault prolapse, urethral polyp, mixed stress incontinence.    Pt is doing well. Pain is well controlled. She is tolerating a regular diet without N/V. Has ambulated to and from the bathroom. Voiding via chang. Admits to flatus.   OBJECTIVE:     Vital Signs   Temp:  [97.1 °F (36.2 °C)-98.6 °F (37 °C)] 98.6 °F (37 °C)  Pulse:  [59-87] 87  Resp:  [11-24] 18  SpO2:  [94 %-100 %] 96 %  BP: ()/(57-89) 119/89      Intake/Output Summary (Last 24 hours) at 2025 0655  Last data filed at 2025 0535  Gross per 24 hour   Intake 1000 ml   Output 1225 ml   Net -225 ml       Physical Exam:  Gen: Alert, oriented to person, but not to place or time, NAD  CV: RRR  Pulm: LCTAB, normal respiratory effort  Abd: Normoactive bowel sounds, soft, non-distented, non-tender to palpation without rebound or guarding  Ext: PPP, no peripheral edema  : Chang in place draining clear yellow urine. UOP 1.13 cc/kg/hr     Laboratory:  Recent Results (from the past 24 hours)   Type & Screen Pre Op    Collection Time: 07/15/25  9:23 AM   Result Value Ref Range    Specimen Outdate 2025 23:59     Group & Rh A POS     Indirect Eliot NEG    ABORH RETYPE    Collection Time: 07/15/25  9:55 AM   Result Value Ref Range    ABORH Retype A POS    CBC with Differential    Collection Time: 25  2:48 AM   Result Value Ref Range    WBC 9.80 3.90 - 12.70 K/uL    RBC 4.12 4.00 - 5.40 M/uL    HGB 11.8 (L) 12.0 - 16.0 gm/dL    HCT 36.7 (L) 37.0 - 48.5 %    MCV 89 82 - 98 fL    MCH 28.6 27.0 - 31.0 pg    MCHC 32.2 32.0 - 36.0 g/dL    RDW 13.9 11.5 - 14.5 %    Platelet Count 261 150 - 450 K/uL    MPV 10.4 9.2 - 12.9 fL    Nucleated RBC 0 <=0  /100 WBC    Neut % 78.9 (H) 38 - 73 %    Lymph % 13.0 (L) 18 - 48 %    Mono % 7.7 4 - 15 %    Eos % 0.0 <=8 %    Basophil % 0.2 <=1.9 %    Imm Grans % 0.2 0.0 - 0.5 %    Neut # 7.74 (H) 1.8 - 7.7 K/uL    Lymph # 1.27 1 - 4.8 K/uL    Mono # 0.75 0.3 - 1 K/uL    Eos # 0.00 <=0.5 K/uL    Baso # 0.02 <=0.2 K/uL    Imm Grans # 0.02 0.00 - 0.04 K/uL         ASSESSMENT/PLAN:       Assessment: 80 y.o. female s/p posterior colpocleisis, mid-urethral sling, excision of urethral lesion, cystourethroscopy, perineorrhaphy who is recovering well.     Plan:   1. Post-op    - Routine post-op advances   - Continue PRN pain medications   - Continue chang, will follow up outpatient for voiding trial   - Encourage ambulation    - Encourage IS   - H/H stable    - UOP adequate    - Continue IVF until tolerating regular diet.   - Antiemetics prn nausea/vomiting    2. Hypertension   - Held home medication quinapril. Restart upon discharge.   - BP as above.    3. Dementia   - Continue home medication donepezil.   - Primary urogyn to discuss long-term care with family.    Dispo: As patient meets appropriate post-op milestones, plan for discharge to home today.    Zoey Raymond

## 2025-07-16 NOTE — DISCHARGE SUMMARY
Discharge Summary  Gynecology      Admit Date: 7/15/2025    Discharge Date and Time: 2025     Attending Physician: Agustin Harkins MD    Principal Diagnoses:   Status post cystoscopy    Active Hospital Problems    Diagnosis  POA    *Status post colpocleisis/ posterior repiar/ midurethral sling w/ cystoscopy and urethal excision lesion [Z98.890]  Not Applicable    BMI 23.0-23.9, adult [Z68.23]  Not Applicable    Urethral lesion [N36.9]  Yes      Resolved Hospital Problems   No resolved problems to display.       Procedures:   Procedure(s) (LRB):  COLPOCLEISIS (N/A)  SLING, MIDURETHRAL (N/A)  CYSTOSCOPY (N/A)  EXCISION, LESION, URETHRA (N/A)  PERINEOPLASTY (N/A)    Discharged Condition: good    Hospital Course:   Hanna Prince is a 80 y.o.  female who presented on 7/15/2025 for the above-listed procedures for the treatment of vaginal vault prolapse, urethral polyp, and mixed urge and stress incontinence. Patient tolerated the procedure well and was admitted for post-operative care. Post-operative course was uncomplicated.    On day of discharge (POD#1), patient was in stable condition, having met all post-operative milestones. She was ambulating, and tolerating a regular diet without nausea/vomiting. The patient was discharged with chang catheter in place to be removed outpatient. Pain was well-controlled on oral medication. She was discharged with medications and follow up as listed below.     Consults: None    Significant Diagnostic Studies:  Recent Labs   Lab 25  0248   WBC 9.80   HGB 11.8*   HCT 36.7*   MCV 89           Treatments:   1. Surgery as above    Disposition: Home or Self Care    Patient Instructions:   Current Discharge Medication List        START taking these medications    Details   acetaminophen (TYLENOL) 500 MG tablet Take 1 tablet (500 mg total) by mouth every 6 (six) hours. Can alternate with ibuprofen, so you are taking something for pain every three hours  Qty: 60  tablet, Refills: 0      cefdinir (OMNICEF) 300 MG capsule Take 1 capsule (300 mg total) by mouth 2 (two) times daily. for 10 days  Qty: 20 capsule, Refills: 0      docusate sodium (COLACE) 100 MG capsule Take 1 capsule (100 mg total) by mouth 2 (two) times daily.  Qty: 60 capsule, Refills: 0      oxyCODONE (ROXICODONE) 5 MG immediate release tablet Take 1 tablet (5 mg total) by mouth every 4 (four) hours as needed for Pain (pain greater than a 7/10 ( severe pain)).  Qty: 5 tablet, Refills: 0    Associated Diagnoses: Status post cystoscopy      traMADoL (ULTRAM) 50 mg tablet Take 1 tablet (50 mg total) by mouth every 6 (six) hours as needed for Pain.  Qty: 20 tablet, Refills: 0    Comments: Quantity prescribed more than 7 day supply? No  Associated Diagnoses: Status post cystoscopy           CONTINUE these medications which have NOT CHANGED    Details   ciprofloxacin HCl (CIPRO) 250 MG tablet Take 250 mg by mouth 2 (two) times daily.      multivitamin (ONE DAILY MULTIVITAMIN) per tablet Take 1 tablet by mouth once daily.      donepeziL (ARICEPT) 10 MG tablet Take 10 mg by mouth every evening.      estradioL (ESTRACE) 0.01 % (0.1 mg/gram) vaginal cream Place 1 g vaginally once daily.  Qty: 30 g, Refills: 11      quinapril (ACCUPRIL) 10 MG tablet Take 10 mg by mouth every evening.             Discharge Procedure Orders   Diet general     Diet Adult Regular     Lifting restrictions     Call MD for:  temperature >100.4     Call MD for:  persistent nausea and vomiting     Call MD for:  difficulty breathing, headache or visual disturbances     Call MD for:  severe uncontrolled pain     Call MD for:  redness, tenderness, or signs of infection (pain, swelling, redness, odor or green/yellow discharge around incision site)     Lifting restrictions   Order Comments: Avoid lifting more than 10 pounds (or a gallon of milk) for 6 weeks or until cleared by Dr. Harkins.     No driving until:   Order Comments: Do not drive until you  are no longer taking narcotic pain medications and you feel comfortable stepping on the brake.     Pelvic Rest   Order Comments: Nothing in the vagina including tampons and intercourse for 6 weeks or until cleared by Dr. Harkins.     Notify your health care provider if you experience any of the following:  temperature >100.4     Notify your health care provider if you experience any of the following:  persistent nausea and vomiting or diarrhea     Notify your health care provider if you experience any of the following:  severe uncontrolled pain     Notify your health care provider if you experience any of the following:  redness, tenderness, or signs of infection (pain, swelling, redness, odor or green/yellow discharge around incision site)     Notify your health care provider if you experience any of the following:  severe persistent headache     Notify your health care provider if you experience any of the following:  persistent dizziness, light-headedness, or visual disturbances     Notify your health care provider if you experience any of the following:  increased confusion or weakness     Notify your health care provider if you experience any of the following:   Order Comments: BLEEDING PRECAUTIONS: Please report to the Emergency Room or contact your physician if you are saturating 1 thick overnight pad per hour for 2 consecutive hours.    CONSTIPATION REMEDIES: Patients are often constipated after surgery or with use of narcotic pain medicine. Remain adequately hydrated by consuming 8 standard water bottles daily. Take a stool softener (Colace or Sennakot) daily, or Mirilax if you prefer. If you have not had a bowel movement for 3 days after surgery, or are uncomfortable and unable to pass stool, please try one or all of the following measures:  1.  Milk of Magnesia - 30 cc by mouth every 12 hours   2.  Dulcolax suppository - one suppository per rectum every 4-6 hours   3.  Metamucil, Fibercon or other bulk  former - use as directed on packaging  4.  Fleet enema     Activity as tolerated        Follow-up Information       Agustin Harkins MD Follow up on 7/18/2025.    Specialties: UroGynecology , Gynecology  Contact information:  [address] [id number] [phone]                             Yaniv Burkett MD PGY-4  Obstetrics and Gynecology

## 2025-07-16 NOTE — ANESTHESIA POSTPROCEDURE EVALUATION
Anesthesia Post Evaluation    Patient: Hanna Prince    Procedure(s) Performed: Procedure(s) (LRB):  COLPOCLEISIS (N/A)  SLING, MIDURETHRAL (N/A)  CYSTOSCOPY (N/A)  EXCISION, LESION, URETHRA (N/A)  PERINEOPLASTY (N/A)    Final Anesthesia Type: general      Patient location during evaluation: [facility]  Patient participation: Yes- Able to Participate  Level of consciousness: awake and alert  Post-procedure vital signs: reviewed and stable  Pain management: adequate  Airway patency: patent    PONV status at discharge: No PONV  Anesthetic complications: no      Cardiovascular status: blood pressure returned to baseline  Respiratory status: unassisted  Hydration status: euvolemic  Follow-up not needed.          Vitals Value Taken Time   /73 07/15/25 19:30   Temp 36.4 °C (97.5 °F) 07/15/25 15:45   Pulse 71 07/15/25 19:30   Resp 12 07/15/25 19:30   SpO2 94 % 07/15/25 19:30         Event Time   Out of Recovery 22:45:00         Pain/Tenisha Score: Pain Rating Prior to Med Admin: 2 (7/16/2025  4:19 AM)  Tenisha Score: 10 (7/15/2025  7:15 PM)

## 2025-07-16 NOTE — ED TRIAGE NOTES
Patient identifiers verified and correct for Hanna Prince  C/C: chang catheter problem  APPEARANCE: awake and alert in NAD  SKIN: warm, dry and intact. No breakdown or bruising.  MUSCULOSKELETAL: Patient moving all extremities spontaneously, no obvious swelling or deformities noted. Ambulates independently.  RESPIRATORY: Denies shortness of breath.Respirations unlabored.   CARDIAC: Denies CP, 2+ distal pulses; no peripheral edema  ABDOMEN: S/ND/NT, Denies nausea  : voids spontaneously, denies difficulty  Neurologic: AAO x 4; follows commands equal strength in all extremities; denies numbness/tingling. Denies dizziness       Family reports that chang catheter came out. Had vaginal prolapse surgery yesterday, sent home with chang, and daughter noticed there was urine in the pad and running down pt leg when pt stated that she wanted to go to the bathroom.

## 2025-07-16 NOTE — NURSING TRANSFER
Nursing Transfer Note      7/15/2025   10:41 PM    Nurse giving handoff:DANE Carmen RN  Nurse receiving handoff:LUISANA Ferreira RN    Reason patient is being transferred: post anesthesia care    Transfer To: [place] [address]    Transfer via stretcher    Transported by PCT x1    Transfer Vital Signs:  Blood Pressure:128/75  Heart Rate:66  O2:97  Temperature:97.9  Respirations:15    Order for Tele Monitor? No    Additional Lines: Leonard Catheter      Any special needs or follow-up needed:     Patient belongings transferred with patient: clear belongings bag on bed with pt    Chart send with patient: Yes    Notified: daughter via text    Patient reassessed at: 2200

## 2025-07-16 NOTE — CARE UPDATE
Unit ITA Care Support Interaction      I have reviewed the chart of Hanna Prince who is hospitalized for Status post cystoscopy. The patient is currently located in the following unit: [facility]        I have assisted the primary physician in management of the following:   Chang   Audited chang bundle educated tony on clip     I have seen and examined the patient and provided the following support:     Patient experience rounds - positive experience reported noted trying to get out of bed reoriented .        Shade Horta, HENRIQUEP-C  Unit Based ITA

## 2025-07-16 NOTE — ED PROVIDER NOTES
Encounter Date: 7/16/2025       History     Chief Complaint   Patient presents with    Post-op Problem     Pt had vaginal prolapse surgery today-d/c'd with chang.  Daughter states urine is coming out around catheter, unsure if still in place.       80 y.o. Female who is 1 day s/p colpocleisis presents with displacement of chang catheter.  She was discharged from the hospital this afternoon.  She noticed urine running down her leg when she urinated this afternoon. She notes no new urine in the chang bag since being discharged from the hospital.  She has only urinated 1x  since being home.  She does not have abdominal pain or any other complaints at this time.    The history is provided by the patient.     Review of patient's allergies indicates:  No Known Allergies  Past Medical History:   Diagnosis Date    Hypertension     Vaginal prolapse      Past Surgical History:   Procedure Laterality Date    ABLATION COLPOCLESIS N/A 7/15/2025    Procedure: COLPOCLEISIS;  Surgeon: Agustin Harkins MD;  Location: [address];  Service: Urology;  Laterality: N/A;    CYSTOSCOPY N/A 7/15/2025    Procedure: CYSTOSCOPY;  Surgeon: Agustin Harkins MD;  Location: [address];  Service: Urology;  Laterality: N/A;    EXCISION OF LESION OF URETHRA N/A 7/15/2025    Procedure: EXCISION, LESION, URETHRA;  Surgeon: Agustin Harkins MD;  Location: [address];  Service: Urology;  Laterality: N/A;  Excision vs Biopsy    HYSTERECTOMY      INSERTION OF MIDURETHRAL SLING N/A 7/15/2025    Procedure: SLING, MIDURETHRAL;  Surgeon: Agustin Harkins MD;  Location: [address];  Service: Urology;  Laterality: N/A;    PERINEOPLASTY N/A 7/15/2025    Procedure: PERINEOPLASTY;  Surgeon: Agustin Harkins MD;  Location: [address];  Service: Urology;  Laterality: N/A;     No family history on file.  Social History[1]  Review of Systems    Physical Exam     Initial Vitals [07/16/25 1753]   BP Pulse Resp Temp SpO2   (!) 173/101 80 20  98.4 °F (36.9 °C) 97 %      MAP       --         Physical Exam    Nursing note and vitals reviewed.  Constitutional: She appears well-developed and well-nourished. She is not diaphoretic. No distress.   HENT:   Head: Normocephalic and atraumatic.   Nose: Nose normal.   Eyes: Conjunctivae and EOM are normal.   Neck: Neck supple.   Cardiovascular:  Normal rate.           Pulmonary/Chest: No respiratory distress.   Abdominal: Abdomen is soft. She exhibits no distension. There is no guarding.   Genitourinary:    Genitourinary Comments: Minimal yellow urine in chang bag     Musculoskeletal:      Cervical back: Neck supple.     Neurological: She is alert and oriented to person, place, and time. Gait normal.   Skin: No rash noted.   Psychiatric: She has a normal mood and affect. Thought content normal.         ED Course   Procedures  Labs Reviewed - No data to display         Imaging Results    None          Medications - No data to display  Medical Decision Making  80 y.o. Female who is 1 day s/p colpocleisis presents with displacement of chang catheter. Nontoxic appearing.  Hypertensive.  Afebrile. Exam as above. I will initiate workup and reassess.  Plan to replace chang at this time.    Urine Chang exchanged with 300 mL of light yellow urine output.  Patient feels well after Chang  exchange.  She has no complaints at this time.  At this time she is stable for discharge                                            Clinical Impression:  Final diagnoses:  [T83.9XXA] Problem with Chang catheter, initial encounter (Primary)          ED Disposition Condition    Discharge Stable          ED Prescriptions    None       Follow-up Information       Follow up With Specialties Details Why Contact Info    [facility] - Emergency Dept Emergency Medicine  If symptoms worsen [address] [facility] [id number] [phone]    Agustin Harkins MD UroGynecology , Gynecology Go on 7/18/2025  [address]  [place]  [place] [address]  [phone]                     [1]   Social History  Tobacco Use    Smoking status: Never    Smokeless tobacco: Never   Substance Use Topics    Alcohol use: Yes     Comment: Aria Vinson PA-C  07/17/25 0029

## 2025-07-17 ENCOUNTER — TELEPHONE (OUTPATIENT)
Dept: UROGYNECOLOGY | Facility: CLINIC | Age: 80
End: 2025-07-17
Payer: MEDICARE

## 2025-07-17 NOTE — TELEPHONE ENCOUNTER
Pt daughter states they did not do discharge teaching with her or her brother before taking mom home. Pt has not been taking antibiotics since discharge. Pt is confused, disoriented, not sure of place (she is safe at her home with daughter). Pt does typically live alone. Daughter is there with her. Pt did not sleep all night and was talking to people and trying to get them out of her room. Advised pt daughter I would speak with Dr. Harkins and return call as soon as possible.      Copied from CRM #[id number]. Topic: General Inquiry - Patient Advice  >> Jul 17, 2025 10:22 AM Sana wrote:  Type: Patient Call Back    Who called: Daughter Hanna     What is the request in detail: Pt had a surgery on her 7/15 and she has been showing signs of dementia/ was waking up in the middle of the  night talking to people who were not there/ putting her shoes on to leave. She asked if this is a possible side affect of the anesthesia and if she can have medication for it due to her family not being able to sleep because if this.    Can the clinic reply by [facility]? no    Would the patient rather a call back or a response via [facility]? Call back     Best call back number:[phone]

## 2025-07-17 NOTE — TELEPHONE ENCOUNTER
Spoke with pt daughter. Will monitor mother and if symptoms get worse she will bring her to [facility] [place] ED.

## 2025-07-18 ENCOUNTER — TELEPHONE (OUTPATIENT)
Dept: UROGYNECOLOGY | Facility: CLINIC | Age: 80
End: 2025-07-18
Payer: MEDICARE

## 2025-07-18 LAB
ESTROGEN SERPL-MCNC: NORMAL PG/ML
INSULIN SERPL-ACNC: NORMAL U[IU]/ML
LAB AP CLINICAL INFORMATION: NORMAL
LAB AP GROSS DESCRIPTION: NORMAL
LAB AP PERFORMING LOCATION(S): NORMAL
LAB AP REPORT FOOTNOTES: NORMAL

## 2025-07-18 NOTE — TELEPHONE ENCOUNTER
Pt's daughter in law calls to ask when they are supposed to come in for chang removal. She states dc paperwork states FU with Dr. Harkins 7/18/25, so they assumed it was for chang removal. Nurse states there has not been any communication for chang removal. Dr. Parham contacts Dr. Harkins. Dr. Harkins states plan is for chang to remain 1 week. Nurse calls daughter to schedule void trial for next week. Message left explaining that chang should remain X 1 week because a lesion was removed in that area. Call back number provided to schedule.

## 2025-07-22 ENCOUNTER — CLINICAL SUPPORT (OUTPATIENT)
Dept: UROGYNECOLOGY | Facility: CLINIC | Age: 80
End: 2025-07-22
Payer: MEDICARE

## 2025-07-22 VITALS
BODY MASS INDEX: 24.28 KG/M2 | DIASTOLIC BLOOD PRESSURE: 79 MMHG | SYSTOLIC BLOOD PRESSURE: 138 MMHG | HEIGHT: 60 IN | HEART RATE: 84 BPM | WEIGHT: 123.69 LBS

## 2025-07-22 DIAGNOSIS — R33.9 INCOMPLETE BLADDER EMPTYING: Primary | ICD-10-CM

## 2025-07-22 PROCEDURE — 87086 URINE CULTURE/COLONY COUNT: CPT

## 2025-07-22 NOTE — PROGRESS NOTES
The patient was placed in dorsal lithotomy position with feet in stirrups.    The bladder was filled with 717 mL sterile water to gravity with a 60 mL chang tipped syringe, at which point she voiced a strong desire to void, and some urine begins to leak out from around chang onto deanna pad .  The  catheter baloon was deflated.The catheter was removed. She voided 20 mL.   Deanna pad weighed. It has 100cc urine on it. There was 90 cc left in the bottle of sterile water, 40 cc left in the syringe. She tolerated the procedure well. Pt urinates 20 cc.  cc. Pt waits and we decide with JOE Coy that she will return to clinic in 1-2 hours to try to urinate again and get another PVR. As pr rdressed, she feels the urge to urinate. She urinates 150cc clear yellow urine. This is considered a pass for her urine trial.    Mariama Cotton RN

## 2025-07-24 LAB — BACTERIA UR CULT: NO GROWTH

## 2025-08-18 ENCOUNTER — OFFICE VISIT (OUTPATIENT)
Dept: UROGYNECOLOGY | Facility: CLINIC | Age: 80
End: 2025-08-18
Payer: MEDICARE

## 2025-08-18 VITALS
BODY MASS INDEX: 23.67 KG/M2 | SYSTOLIC BLOOD PRESSURE: 132 MMHG | HEIGHT: 60 IN | HEART RATE: 81 BPM | WEIGHT: 120.56 LBS | DIASTOLIC BLOOD PRESSURE: 86 MMHG

## 2025-08-18 DIAGNOSIS — Z98.890 STATUS POST CYSTOSCOPY: ICD-10-CM

## 2025-08-18 DIAGNOSIS — R41.3 MEMORY LOSS: Primary | ICD-10-CM

## 2025-08-18 DIAGNOSIS — N89.8 VAGINAL DRYNESS: ICD-10-CM

## 2025-08-18 PROCEDURE — 3288F FALL RISK ASSESSMENT DOCD: CPT | Mod: CPTII,S$GLB,, | Performed by: OBSTETRICS & GYNECOLOGY

## 2025-08-18 PROCEDURE — 1101F PT FALLS ASSESS-DOCD LE1/YR: CPT | Mod: CPTII,S$GLB,, | Performed by: OBSTETRICS & GYNECOLOGY

## 2025-08-18 PROCEDURE — 1159F MED LIST DOCD IN RCRD: CPT | Mod: CPTII,S$GLB,, | Performed by: OBSTETRICS & GYNECOLOGY

## 2025-08-18 PROCEDURE — 3075F SYST BP GE 130 - 139MM HG: CPT | Mod: CPTII,S$GLB,, | Performed by: OBSTETRICS & GYNECOLOGY

## 2025-08-18 PROCEDURE — 1160F RVW MEDS BY RX/DR IN RCRD: CPT | Mod: CPTII,S$GLB,, | Performed by: OBSTETRICS & GYNECOLOGY

## 2025-08-18 PROCEDURE — 99999 PR PBB SHADOW E&M-EST. PATIENT-LVL IV: CPT | Mod: PBBFAC,,, | Performed by: OBSTETRICS & GYNECOLOGY

## 2025-08-18 PROCEDURE — 1126F AMNT PAIN NOTED NONE PRSNT: CPT | Mod: CPTII,S$GLB,, | Performed by: OBSTETRICS & GYNECOLOGY

## 2025-08-18 PROCEDURE — 3079F DIAST BP 80-89 MM HG: CPT | Mod: CPTII,S$GLB,, | Performed by: OBSTETRICS & GYNECOLOGY

## 2025-08-18 PROCEDURE — 99024 POSTOP FOLLOW-UP VISIT: CPT | Mod: S$GLB,,, | Performed by: OBSTETRICS & GYNECOLOGY

## (undated) DEVICE — DRAPE UINDERBUT GRAD PCH

## (undated) DEVICE — ELECTRODE REM PLYHSV RETURN 9

## (undated) DEVICE — PENCIL SMK EVAC CONNECTOR 10FT

## (undated) DEVICE — BANDAGE ROLL COTTN 4.5INX4.1YD

## (undated) DEVICE — SUT VICRYL COAT ANTI 0 27IN

## (undated) DEVICE — TRAY SKIN SCRUB WET PREMIUM

## (undated) DEVICE — SUT 2-0 VICRYL / SH (J417)

## (undated) DEVICE — TRAY CATH 1-LYR URIMTR 16FR

## (undated) DEVICE — DRAPE STERI INSTRUMENT 1018

## (undated) DEVICE — SUT MCRYL PLUS 4-0 PS2 27IN

## (undated) DEVICE — SYR 10CC LUER LOCK

## (undated) DEVICE — ADHESIVE DERMABOND ADVANCED

## (undated) DEVICE — TRAY MINOR GEN SURG OMC

## (undated) DEVICE — SOL IRR WATER STRL 3000 ML

## (undated) DEVICE — SET IRR URLGY 2LINE UNIV SPIKE

## (undated) DEVICE — CLIPPER BLADE MOD 4406 (CAREF)

## (undated) DEVICE — Device

## (undated) DEVICE — ADHESIVE MASTISOL VIAL 48/BX

## (undated) DEVICE — ELECTRODE MEGADYNE RETURN DUAL

## (undated) DEVICE — SUT CTD VICRYL VIL BR SH 27

## (undated) DEVICE — TUBING SUC UNIV W/CONN 12FT

## (undated) DEVICE — LUBRICANT SURGILUBE 2 OZ

## (undated) DEVICE — SUT PDS II 2-0 CT-2 VIL